# Patient Record
Sex: MALE | Race: WHITE | Employment: FULL TIME | URBAN - METROPOLITAN AREA
[De-identification: names, ages, dates, MRNs, and addresses within clinical notes are randomized per-mention and may not be internally consistent; named-entity substitution may affect disease eponyms.]

---

## 2018-10-20 ENCOUNTER — HOSPITAL ENCOUNTER (EMERGENCY)
Age: 58
Discharge: HOME | End: 2018-10-20
Attending: EMERGENCY MEDICINE
Payer: COMMERCIAL

## 2018-10-20 VITALS
WEIGHT: 265 LBS | TEMPERATURE: 97.8 F | OXYGEN SATURATION: 98 % | BODY MASS INDEX: 35.12 KG/M2 | HEIGHT: 73 IN | SYSTOLIC BLOOD PRESSURE: 149 MMHG | RESPIRATION RATE: 17 BRPM | HEART RATE: 82 BPM | DIASTOLIC BLOOD PRESSURE: 65 MMHG

## 2018-10-20 DIAGNOSIS — T15.92XA EYE FOREIGN BODIES, LEFT, INITIAL ENCOUNTER: Primary | ICD-10-CM

## 2018-10-20 PROCEDURE — 90715 TDAP VACCINE 7 YRS/> IM: CPT | Performed by: EMERGENCY MEDICINE

## 2018-10-20 PROCEDURE — 90471 IMMUNIZATION ADMIN: CPT | Performed by: EMERGENCY MEDICINE

## 2018-10-20 PROCEDURE — 6370000000 HC RX 637 (ALT 250 FOR IP): Performed by: EMERGENCY MEDICINE

## 2018-10-20 PROCEDURE — 99283 EMERGENCY DEPT VISIT LOW MDM: CPT

## 2018-10-20 PROCEDURE — 6370000000 HC RX 637 (ALT 250 FOR IP)

## 2018-10-20 PROCEDURE — 6360000002 HC RX W HCPCS: Performed by: EMERGENCY MEDICINE

## 2018-10-20 RX ORDER — ERYTHROMYCIN 5 MG/G
OINTMENT OPHTHALMIC ONCE
Status: COMPLETED | OUTPATIENT
Start: 2018-10-20 | End: 2018-10-20

## 2018-10-20 RX ADMIN — ERYTHROMYCIN: 5 OINTMENT OPHTHALMIC at 16:23

## 2018-10-20 RX ADMIN — TETANUS TOXOID, REDUCED DIPHTHERIA TOXOID AND ACELLULAR PERTUSSIS VACCINE, ADSORBED 0.5 ML: 5; 2.5; 8; 8; 2.5 SUSPENSION INTRAMUSCULAR at 16:23

## 2018-10-20 RX ADMIN — FLUORESCEIN SODIUM 1 MG: 1 STRIP OPHTHALMIC at 16:23

## 2018-10-20 ASSESSMENT — PAIN DESCRIPTION - LOCATION: LOCATION: EYE

## 2018-10-20 ASSESSMENT — ENCOUNTER SYMPTOMS: EYE PAIN: 1

## 2018-10-20 ASSESSMENT — PAIN DESCRIPTION - PAIN TYPE: TYPE: ACUTE PAIN

## 2018-10-20 ASSESSMENT — PAIN DESCRIPTION - ORIENTATION: ORIENTATION: LEFT

## 2018-10-20 ASSESSMENT — PAIN SCALES - GENERAL: PAINLEVEL_OUTOF10: 7

## 2018-10-20 NOTE — ED PROVIDER NOTES
History    Marital status: Single     Spouse name: N/A    Number of children: N/A    Years of education: N/A     Social History Main Topics    Smoking status: Former Smoker    Smokeless tobacco: None    Alcohol use 0.5 oz/week     1 drink(s) per week    Drug use: No    Sexual activity: No     Other Topics Concern    None     Social History Narrative    None       SCREENINGS    Key Coma Scale  Eye Opening: Spontaneous  Best Verbal Response: Oriented  Best Motor Response: Obeys commands  Key Coma Scale Score: 15        PHYSICAL EXAM    (up to 7 for level 4, 8 or more for level 5)     ED Triage Vitals [10/20/18 1607]   BP Temp Temp Source Pulse Resp SpO2 Height Weight   (!) 149/65 97.8 °F (36.6 °C) Oral 82 18 95 % 6' 1\" (1.854 m) 265 lb (120.2 kg)       Physical Exam   Constitutional: He appears well-developed and well-nourished. No distress. HENT:   Head: Normocephalic and atraumatic. Mouth/Throat: Oropharynx is clear and moist.   Eyes: Pupils are equal, round, and reactive to light. Conjunctivae and EOM are normal. Right eye exhibits no chemosis. Foreign body present in the right eye. Left eye exhibits no chemosis. Foreign body present in the left eye. Right conjunctiva is not injected. Left conjunctiva is not injected. Left conjunctiva has no hemorrhage. Pupils are equally round and reacting normally. Extraoccular muscles are grossly intact. Neck: Normal range of motion. No tracheal deviation present. Cardiovascular: Normal rate, regular rhythm, normal heart sounds and intact distal pulses. Exam reveals no friction rub. No murmur heard. Pulmonary/Chest: Effort normal and breath sounds normal. No stridor. No respiratory distress. He has no wheezes. He has no rales. Abdominal: Soft. He exhibits no distension. Musculoskeletal: Normal range of motion. He exhibits no edema or deformity. Neurological: He is alert. Answering questions appropriately. No gaze deficit.  No gait note that portions of this note were completed with a voice recognition program.  Efforts were made to edit the dictations but occasionally words are mis-transcribed.)    Jean Pierre Sanders DO (electronically signed)  Attending Emergency Physician       Jean Pierre Sanders DO  10/20/18 1852

## 2023-03-05 ENCOUNTER — HOSPITAL ENCOUNTER (EMERGENCY)
Age: 63
Discharge: HOME | End: 2023-03-05
Attending: EMERGENCY MEDICINE
Payer: COMMERCIAL

## 2023-03-05 VITALS
BODY MASS INDEX: 37.77 KG/M2 | HEART RATE: 70 BPM | WEIGHT: 285 LBS | OXYGEN SATURATION: 96 % | RESPIRATION RATE: 16 BRPM | HEIGHT: 73 IN | SYSTOLIC BLOOD PRESSURE: 147 MMHG | DIASTOLIC BLOOD PRESSURE: 86 MMHG | TEMPERATURE: 97.9 F

## 2023-03-05 DIAGNOSIS — K04.7 DENTAL ABSCESS: ICD-10-CM

## 2023-03-05 DIAGNOSIS — K02.9 DENTAL DECAY: ICD-10-CM

## 2023-03-05 DIAGNOSIS — K08.89 ODONTALGIA: Primary | ICD-10-CM

## 2023-03-05 PROCEDURE — 99283 EMERGENCY DEPT VISIT LOW MDM: CPT

## 2023-03-05 PROCEDURE — 6370000000 HC RX 637 (ALT 250 FOR IP): Performed by: EMERGENCY MEDICINE

## 2023-03-05 RX ORDER — CHLORHEXIDINE GLUCONATE 0.12 MG/ML
15 RINSE ORAL 2 TIMES DAILY
Qty: 420 ML | Refills: 0 | Status: SHIPPED | OUTPATIENT
Start: 2023-03-05 | End: 2023-03-19

## 2023-03-05 RX ORDER — KETOROLAC TROMETHAMINE 10 MG/1
10 TABLET, FILM COATED ORAL EVERY 6 HOURS PRN
Qty: 20 TABLET | Refills: 0 | Status: SHIPPED | OUTPATIENT
Start: 2023-03-05

## 2023-03-05 RX ORDER — CLINDAMYCIN HYDROCHLORIDE 150 MG/1
450 CAPSULE ORAL 3 TIMES DAILY
Qty: 90 CAPSULE | Refills: 0 | Status: SHIPPED | OUTPATIENT
Start: 2023-03-05 | End: 2023-03-15

## 2023-03-05 RX ORDER — CLINDAMYCIN HYDROCHLORIDE 150 MG/1
450 CAPSULE ORAL ONCE
Status: COMPLETED | OUTPATIENT
Start: 2023-03-05 | End: 2023-03-05

## 2023-03-05 RX ORDER — OXYCODONE HYDROCHLORIDE AND ACETAMINOPHEN 5; 325 MG/1; MG/1
1 TABLET ORAL ONCE
Status: COMPLETED | OUTPATIENT
Start: 2023-03-05 | End: 2023-03-05

## 2023-03-05 RX ORDER — OXYCODONE HYDROCHLORIDE AND ACETAMINOPHEN 5; 325 MG/1; MG/1
1-2 TABLET ORAL EVERY 6 HOURS PRN
Qty: 10 TABLET | Refills: 0 | Status: SHIPPED | OUTPATIENT
Start: 2023-03-05 | End: 2023-03-08

## 2023-03-05 RX ADMIN — OXYCODONE AND ACETAMINOPHEN 1 TABLET: 5; 325 TABLET ORAL at 01:58

## 2023-03-05 RX ADMIN — CLINDAMYCIN HYDROCHLORIDE 450 MG: 150 CAPSULE ORAL at 01:58

## 2023-03-05 ASSESSMENT — PAIN SCALES - GENERAL: PAINLEVEL_OUTOF10: 8

## 2023-03-05 ASSESSMENT — ENCOUNTER SYMPTOMS
PHOTOPHOBIA: 0
COLOR CHANGE: 0
ABDOMINAL PAIN: 0
ABDOMINAL DISTENTION: 0
COUGH: 0
VOMITING: 0
NAUSEA: 0
CONSTIPATION: 0
RHINORRHEA: 0
APNEA: 0
WHEEZING: 0
SHORTNESS OF BREATH: 0
EYE PAIN: 0
BACK PAIN: 0
SORE THROAT: 0
DIARRHEA: 0
SINUS PRESSURE: 0

## 2023-03-05 ASSESSMENT — PAIN DESCRIPTION - PAIN TYPE: TYPE: ACUTE PAIN

## 2023-03-05 ASSESSMENT — PAIN DESCRIPTION - LOCATION: LOCATION: TEETH

## 2023-03-05 ASSESSMENT — PAIN DESCRIPTION - DESCRIPTORS: DESCRIPTORS: THROBBING

## 2023-03-05 ASSESSMENT — PAIN DESCRIPTION - FREQUENCY: FREQUENCY: CONTINUOUS

## 2023-03-05 ASSESSMENT — PAIN DESCRIPTION - ONSET: ONSET: PROGRESSIVE

## 2023-03-05 NOTE — ED PROVIDER NOTES
2000 \[facility]\"" ED  eMERGENCY dEPARTMENT eNCOUnter      Pt Name: Gunner Morel  MRN: [id number]  [place] 1960  Date of evaluation: 3/5/2023  Provider: Tammy Bethea MD    CHIEF COMPLAINT       Chief Complaint   Patient presents with    Dental Pain     Right upper jaw         HISTORY OF PRESENT ILLNESS   (Location/Symptom, Timing/Onset,Context/Setting, Quality, Duration, Modifying Factors, Severity)  Note limiting factors. Gunner Morel is a 63 y.o. male who presents to the emergency department with complaint of right upper jaw swelling and dental pain since yesterday. Patient had a crown placed that broke off. He has an appointment with dentist on the 20th. Pain is 8 on a scale of 1-10. Orajel Tylenol are not helping. Hot and cold drinks make it worse. HPI    Nursing Notes were reviewed. REVIEW OF SYSTEMS    (2-9 systems for level 4, 10 or more for level 5)     Review of Systems   Constitutional: Negative. Negative for activity change, appetite change, chills, fatigue and fever. HENT:  Positive for dental problem. Negative for congestion, ear discharge, ear pain, hearing loss, rhinorrhea, sinus pressure and sore throat. Eyes:  Negative for photophobia, pain and visual disturbance. Respiratory:  Negative for apnea, cough, shortness of breath and wheezing. Cardiovascular:  Negative for chest pain, palpitations and leg swelling. Gastrointestinal:  Negative for abdominal distention, abdominal pain, constipation, diarrhea, nausea and vomiting. Endocrine: Negative for cold intolerance, heat intolerance and polyuria. Genitourinary:  Negative for dysuria, flank pain, frequency and urgency. Musculoskeletal:  Negative for arthralgias, back pain, gait problem, myalgias and neck stiffness. Skin:  Negative for color change, pallor and rash. Allergic/Immunologic: Negative for food allergies and immunocompromised state.    Neurological:  Negative for dizziness, tremors, syncope, weakness, light-headedness and headaches. Psychiatric/Behavioral:  Negative for agitation, confusion and hallucinations. All other systems reviewed and are negative. Except as noted above the remainder of the review of systems was reviewed and negative. PAST MEDICAL HISTORY     Past Medical History:   Diagnosis Date    Chronic back pain     Chronic kidney disease     Erectile dysfunction     Osteoarthritis          SURGICAL HISTORY       Past Surgical History:   Procedure Laterality Date    BACK SURGERY      JOINT REPLACEMENT      hip replacement          CURRENT MEDICATIONS       Previous Medications    AVODART 0.5 MG CAPSULE        OXYBUTYNIN (DITROPAN XL) 10 MG CR TABLET    Take 1 tablet by mouth daily. OXYBUTYNIN (DITROPAN-XL) 5 MG CR TABLET        ROPINIROLE (REQUIP) 0.5 MG TABLET        TAMSULOSIN (FLOMAX) 0.4 MG CAPSULE        TRAMADOL (ULTRAM) 50 MG TABLET           ALLERGIES     Pcn [penicillins]    FAMILY HISTORY     No family history on file. SOCIAL HISTORY       Social History     Socioeconomic History    Marital status: Single   Tobacco Use    Smoking status: Former   Substance and Sexual Activity    Alcohol use: Yes     Alcohol/week: 0.8 standard drinks     Types: 1 drink(s) per week    Drug use: No    Sexual activity: Never       SCREENINGS             PHYSICAL EXAM    (up to 7 for level 4, 8 or more for level 5)     ED Triage Vitals [03/05/23 0135]   BP Temp Temp Source Heart Rate Resp SpO2 Height Weight   (!) 147/86 97.9 °F (36.6 °C) Oral 70 16 96 % 6' 1\" (1.854 m) 285 lb (129.3 kg)       Physical Exam  Vitals and nursing note reviewed. Constitutional:       General: He is not in acute distress. Appearance: Normal appearance. He is well-developed and normal weight. He is not ill-appearing, toxic-appearing or diaphoretic. HENT:      Head: Normocephalic and atraumatic. Nose: Nose normal. No congestion or rhinorrhea.       Mouth/Throat:      Mouth: Mucous membranes are moist.      Pharynx: Oropharynx is clear. No oropharyngeal exudate or posterior oropharyngeal erythema. Comments: Diffuse dental decay and cavities. Right facial swelling. Eyes:      General: No scleral icterus. Right eye: No discharge. Left eye: No discharge. Conjunctiva/sclera: Conjunctivae normal.      Pupils: Pupils are equal, round, and reactive to light. Neck:      Thyroid: No thyromegaly. Vascular: No carotid bruit or JVD. Trachea: No tracheal deviation. Cardiovascular:      Rate and Rhythm: Normal rate and regular rhythm. Pulses: Normal pulses. Heart sounds: Normal heart sounds. No murmur heard. No friction rub. No gallop. Pulmonary:      Effort: Pulmonary effort is normal. No respiratory distress. Breath sounds: Normal breath sounds. No stridor. No wheezing, rhonchi or rales. Chest:      Chest wall: No tenderness. Abdominal:      General: Abdomen is flat. Bowel sounds are normal. There is no distension. Palpations: Abdomen is soft. There is no mass. Tenderness: There is no abdominal tenderness. There is no right CVA tenderness, left CVA tenderness, guarding or rebound. Hernia: No hernia is present. Musculoskeletal:         General: No swelling, tenderness, deformity or signs of injury. Normal range of motion. Cervical back: Normal range of motion and neck supple. No rigidity or tenderness. Right lower leg: No edema. Left lower leg: No edema. Lymphadenopathy:      Cervical: No cervical adenopathy. Skin:     General: Skin is warm and dry. Capillary Refill: Capillary refill takes less than 2 seconds. Coloration: Skin is not jaundiced or pale. Findings: No bruising, erythema, lesion or rash. Neurological:      General: No focal deficit present. Mental Status: He is alert and oriented to person, place, and time. Mental status is at baseline. Cranial Nerves: No cranial nerve deficit. Sensory: No sensory deficit. Motor: No weakness or abnormal muscle tone. Coordination: Coordination normal.      Gait: Gait normal.      Deep Tendon Reflexes: Reflexes are normal and symmetric. Reflexes normal.   Psychiatric:         Mood and Affect: Mood normal.         Behavior: Behavior normal.         Thought Content: Thought content normal.         Judgment: Judgment normal.       DIAGNOSTIC RESULTS     EKG: All EKG's are interpreted by the Emergency Department Physician who either signs or Co-signs this chart in the absence of a cardiologist.        RADIOLOGY:   Non-plain film images such as CT, Ultrasound and MRI are read by the radiologist. Idania Arch radiographicimages are visualized and preliminarily interpreted by the emergency physician with the below findings:        Interpretation per the Radiologist below, if available at the time of this note:    No orders to display         ED BEDSIDE ULTRASOUND:   Performed by ED Physician - none    LABS:  Labs Reviewed - No data to display    All other labs were within normal range or not returned as of this dictation. EMERGENCY DEPARTMENT COURSE and DIFFERENTIALDIAGNOSIS/MDM:   Vitals:    Vitals:    03/05/23 0135   BP: (!) 147/86   Pulse: 70   Resp: 16   Temp: 97.9 °F (36.6 °C)   TempSrc: Oral   SpO2: 96%   Weight: 285 lb (129.3 kg)   Height: 6' 1\" (1.854 m)           MDM     Amount and/or Complexity of Data Reviewed  Review and summarize past medical records: yes    Risk of Complications, Morbidity, and/or Mortality  Presenting problems: moderate  Diagnostic procedures: minimal  Management options: moderate    Patient Progress  Patient progress: improved      CRITICAL CARE TIME   Total Critical Care time was  minutes, excluding separately reportable procedures. There was a high probability of clinically significant/life threatening deterioration in the patient's condition which required my urgentintervention. CONSULTS:  None    PROCEDURES:  Unless otherwise noted below, none     Procedures    FINAL IMPRESSION      1. Odontalgia    2. Dental decay    3. Dental abscess          DISPOSITION/PLAN   DISPOSITION Decision To Discharge 03/05/2023 01:50:35 AM      PATIENT REFERRED TO:  Elly Jeter DO  [address] [facility] [address] [phone]    In 3 days      Follow-up with your dentist on Monday, 6 March 2023          DISCHARGE MEDICATIONS:  New Prescriptions    CHLORHEXIDINE (PERIDEX) 0.12 % SOLUTION    Swish and spit 15 mLs 2 times daily for 14 days    CLINDAMYCIN (CLEOCIN) 150 MG CAPSULE    Take 3 capsules by mouth 3 times daily for 10 days    KETOROLAC (TORADOL) 10 MG TABLET    Take 1 tablet by mouth every 6 hours as needed for Pain    OXYCODONE-ACETAMINOPHEN (PERCOCET) 5-325 MG PER TABLET    Take 1-2 tablets by mouth every 6 hours as needed for Pain for up to 3 days. WARNING:  May cause drowsiness. May impair ability to operate vehicles or machinery. Do not use in combination with alcohol.  Max Daily Amount: 8 tablets          (Please note that portions of this note were completed with a voice recognitionprogram.  Efforts were made to edit the dictations but occasionally words are mis-transcribed.)    Phu Block MD (electronically signed)  Attending Emergency Physician          Phu Block MD  03/05/23 2401

## 2023-03-14 DIAGNOSIS — M54.50 CHRONIC BILATERAL LOW BACK PAIN, UNSPECIFIED WHETHER SCIATICA PRESENT: Primary | ICD-10-CM

## 2023-03-14 DIAGNOSIS — G89.29 CHRONIC BILATERAL LOW BACK PAIN, UNSPECIFIED WHETHER SCIATICA PRESENT: Primary | ICD-10-CM

## 2023-03-14 RX ORDER — TRAMADOL HYDROCHLORIDE 50 MG/1
50 TABLET ORAL 2 TIMES DAILY PRN
Qty: 15 TABLET | Refills: 0 | Status: CANCELLED | OUTPATIENT
Start: 2023-03-14

## 2023-03-14 RX ORDER — TRAMADOL HYDROCHLORIDE 50 MG/1
1-2 TABLET ORAL EVERY 12 HOURS PRN
COMMUNITY
End: 2023-03-18 | Stop reason: SDUPTHER

## 2023-03-14 NOTE — TELEPHONE ENCOUNTER
OARRS reviewed.    Pt has RX for Percocet 3/5/2023.  Need to verify why he had RX and from where (I believe it was an ER visit).

## 2023-03-16 NOTE — TELEPHONE ENCOUNTER
This was from [facility] ER visit for tooth infection. He has stopped taking the percocet.

## 2023-03-18 PROBLEM — G47.61 PERIODIC LIMB MOVEMENT: Status: ACTIVE | Noted: 2023-03-18

## 2023-03-18 PROBLEM — G47.33 OSA (OBSTRUCTIVE SLEEP APNEA): Status: ACTIVE | Noted: 2023-03-18

## 2023-03-18 PROBLEM — F17.200 CURRENT SMOKER: Status: ACTIVE | Noted: 2023-03-18

## 2023-03-18 PROBLEM — M79.675 CHRONIC PAIN OF TOE OF LEFT FOOT: Status: ACTIVE | Noted: 2023-03-18

## 2023-03-18 PROBLEM — M25.511 CHRONIC RIGHT SHOULDER PAIN: Status: ACTIVE | Noted: 2023-03-18

## 2023-03-18 PROBLEM — G89.29 CHRONIC PAIN OF TOE OF LEFT FOOT: Status: ACTIVE | Noted: 2023-03-18

## 2023-03-18 PROBLEM — M54.9 CHRONIC BACK PAIN: Status: ACTIVE | Noted: 2023-03-18

## 2023-03-18 PROBLEM — L60.0 ONYCHOCRYPTOSIS: Status: ACTIVE | Noted: 2023-03-18

## 2023-03-18 PROBLEM — B35.1 NAIL FUNGUS: Status: ACTIVE | Noted: 2023-03-18

## 2023-03-18 PROBLEM — G89.29 CHRONIC RIGHT SHOULDER PAIN: Status: ACTIVE | Noted: 2023-03-18

## 2023-03-18 PROBLEM — R33.9 URINARY RETENTION: Status: ACTIVE | Noted: 2023-03-18

## 2023-03-18 PROBLEM — E55.9 VITAMIN D DEFICIENCY: Status: ACTIVE | Noted: 2023-03-18

## 2023-03-18 PROBLEM — G89.29 CHRONIC PAIN OF TOE OF RIGHT FOOT: Status: ACTIVE | Noted: 2023-03-18

## 2023-03-18 PROBLEM — G89.29 CHRONIC BACK PAIN: Status: ACTIVE | Noted: 2023-03-18

## 2023-03-18 PROBLEM — R25.2 NOCTURNAL MUSCLE CRAMP: Status: ACTIVE | Noted: 2023-03-18

## 2023-03-18 PROBLEM — K76.0 NAFLD (NONALCOHOLIC FATTY LIVER DISEASE): Status: ACTIVE | Noted: 2023-03-18

## 2023-03-18 PROBLEM — N40.0 BENIGN PROSTATE HYPERPLASIA: Status: ACTIVE | Noted: 2023-03-18

## 2023-03-18 PROBLEM — D69.6 THROMBOCYTOPENIA (CMS-HCC): Status: ACTIVE | Noted: 2023-03-18

## 2023-03-18 PROBLEM — N52.9 ERECTILE DYSFUNCTION: Status: ACTIVE | Noted: 2023-03-18

## 2023-03-18 PROBLEM — E78.5 HYPERLIPEMIA: Status: ACTIVE | Noted: 2023-03-18

## 2023-03-18 PROBLEM — M79.674 CHRONIC PAIN OF TOE OF RIGHT FOOT: Status: ACTIVE | Noted: 2023-03-18

## 2023-03-18 PROBLEM — R79.89 ELEVATED LFTS: Status: ACTIVE | Noted: 2023-03-18

## 2023-03-18 PROBLEM — K21.9 GERD (GASTROESOPHAGEAL REFLUX DISEASE): Status: ACTIVE | Noted: 2023-03-18

## 2023-03-18 PROBLEM — R73.9 ELEVATED BLOOD SUGAR: Status: ACTIVE | Noted: 2023-03-18

## 2023-03-18 RX ORDER — TRAMADOL HYDROCHLORIDE 50 MG/1
50-100 TABLET ORAL EVERY 12 HOURS PRN
Qty: 36 TABLET | Refills: 0 | Status: SHIPPED | OUTPATIENT
Start: 2023-03-18 | End: 2023-04-05 | Stop reason: SDUPTHER

## 2023-04-04 RX ORDER — KETOROLAC TROMETHAMINE 10 MG/1
1 TABLET, FILM COATED ORAL EVERY 6 HOURS PRN
COMMUNITY
Start: 2023-03-05 | End: 2023-04-05 | Stop reason: ALTCHOICE

## 2023-04-04 RX ORDER — LANOLIN ALCOHOL/MO/W.PET/CERES
1 CREAM (GRAM) TOPICAL DAILY
COMMUNITY
Start: 2022-10-03

## 2023-04-04 RX ORDER — MELOXICAM 15 MG/1
15 TABLET ORAL DAILY
COMMUNITY
Start: 2023-01-10 | End: 2023-04-05 | Stop reason: ALTCHOICE

## 2023-04-04 RX ORDER — CLINDAMYCIN HYDROCHLORIDE 300 MG/1
CAPSULE ORAL
COMMUNITY
Start: 2023-02-25 | End: 2023-04-05 | Stop reason: ALTCHOICE

## 2023-04-04 RX ORDER — CLINDAMYCIN HYDROCHLORIDE 150 MG/1
CAPSULE ORAL
COMMUNITY
Start: 2023-03-05 | End: 2023-04-05 | Stop reason: ALTCHOICE

## 2023-04-04 RX ORDER — OXYBUTYNIN CHLORIDE 5 MG/1
5 TABLET ORAL 2 TIMES DAILY
COMMUNITY
End: 2023-07-05 | Stop reason: SDUPTHER

## 2023-04-04 RX ORDER — ROPINIROLE 0.5 MG/1
0.5 TABLET, FILM COATED ORAL NIGHTLY
COMMUNITY
End: 2023-04-05 | Stop reason: SDUPTHER

## 2023-04-04 RX ORDER — TAMSULOSIN HYDROCHLORIDE 0.4 MG/1
0.4 CAPSULE ORAL DAILY
COMMUNITY
End: 2023-04-05 | Stop reason: SDUPTHER

## 2023-04-04 RX ORDER — VIT C/E/ZN/COPPR/LUTEIN/ZEAXAN 250MG-90MG
CAPSULE ORAL
COMMUNITY
Start: 2018-08-01

## 2023-04-04 RX ORDER — ESOMEPRAZOLE MAGNESIUM 40 MG/1
40 CAPSULE, DELAYED RELEASE ORAL DAILY
COMMUNITY
End: 2023-04-05 | Stop reason: SDUPTHER

## 2023-04-04 RX ORDER — DUTASTERIDE 0.5 MG/1
0.5 CAPSULE, LIQUID FILLED ORAL DAILY
COMMUNITY
End: 2023-04-05 | Stop reason: SDUPTHER

## 2023-04-04 RX ORDER — OXYCODONE AND ACETAMINOPHEN 5; 325 MG/1; MG/1
TABLET ORAL
COMMUNITY
Start: 2023-03-05 | End: 2023-04-05 | Stop reason: ALTCHOICE

## 2023-04-04 RX ORDER — CHLORHEXIDINE GLUCONATE ORAL RINSE 1.2 MG/ML
SOLUTION DENTAL
COMMUNITY
Start: 2023-03-05 | End: 2023-04-05 | Stop reason: ALTCHOICE

## 2023-04-05 ENCOUNTER — OFFICE VISIT (OUTPATIENT)
Dept: PRIMARY CARE | Facility: CLINIC | Age: 63
End: 2023-04-05
Payer: COMMERCIAL

## 2023-04-05 VITALS
DIASTOLIC BLOOD PRESSURE: 72 MMHG | WEIGHT: 288 LBS | RESPIRATION RATE: 17 BRPM | SYSTOLIC BLOOD PRESSURE: 134 MMHG | HEART RATE: 77 BPM | BODY MASS INDEX: 38.17 KG/M2 | HEIGHT: 73 IN | OXYGEN SATURATION: 93 % | TEMPERATURE: 98.6 F

## 2023-04-05 DIAGNOSIS — K21.9 GASTROESOPHAGEAL REFLUX DISEASE WITHOUT ESOPHAGITIS: ICD-10-CM

## 2023-04-05 DIAGNOSIS — Z12.5 PROSTATE CANCER SCREENING: ICD-10-CM

## 2023-04-05 DIAGNOSIS — D69.6 THROMBOCYTOPENIA (CMS-HCC): ICD-10-CM

## 2023-04-05 DIAGNOSIS — F17.200 CURRENT SMOKER: ICD-10-CM

## 2023-04-05 DIAGNOSIS — E78.2 MIXED HYPERLIPIDEMIA: Primary | ICD-10-CM

## 2023-04-05 DIAGNOSIS — G89.29 CHRONIC BILATERAL LOW BACK PAIN, UNSPECIFIED WHETHER SCIATICA PRESENT: ICD-10-CM

## 2023-04-05 DIAGNOSIS — Z79.899 HIGH RISK MEDICATION USE: ICD-10-CM

## 2023-04-05 DIAGNOSIS — R73.01 IMPAIRED FASTING GLUCOSE: ICD-10-CM

## 2023-04-05 DIAGNOSIS — G47.33 OSA (OBSTRUCTIVE SLEEP APNEA): ICD-10-CM

## 2023-04-05 DIAGNOSIS — G47.61 PERIODIC LIMB MOVEMENT: ICD-10-CM

## 2023-04-05 DIAGNOSIS — N40.1 BENIGN PROSTATIC HYPERPLASIA WITH URINARY RETENTION: ICD-10-CM

## 2023-04-05 DIAGNOSIS — E55.9 VITAMIN D DEFICIENCY: ICD-10-CM

## 2023-04-05 DIAGNOSIS — R33.9 URINARY RETENTION: ICD-10-CM

## 2023-04-05 DIAGNOSIS — R33.8 BENIGN PROSTATIC HYPERPLASIA WITH URINARY RETENTION: ICD-10-CM

## 2023-04-05 DIAGNOSIS — K76.0 NAFLD (NONALCOHOLIC FATTY LIVER DISEASE): ICD-10-CM

## 2023-04-05 DIAGNOSIS — M54.50 CHRONIC BILATERAL LOW BACK PAIN, UNSPECIFIED WHETHER SCIATICA PRESENT: ICD-10-CM

## 2023-04-05 PROCEDURE — 99214 OFFICE O/P EST MOD 30 MIN: CPT | Performed by: FAMILY MEDICINE

## 2023-04-05 RX ORDER — CALCIUM CARBONATE 200(500)MG
1 TABLET,CHEWABLE ORAL
COMMUNITY
End: 2024-04-03

## 2023-04-05 RX ORDER — TAMSULOSIN HYDROCHLORIDE 0.4 MG/1
0.4 CAPSULE ORAL
COMMUNITY
End: 2023-04-05 | Stop reason: SDUPTHER

## 2023-04-05 RX ORDER — TRAMADOL HYDROCHLORIDE 50 MG/1
50-100 TABLET ORAL EVERY 12 HOURS PRN
Qty: 60 TABLET | Refills: 0 | Status: SHIPPED | OUTPATIENT
Start: 2023-04-05 | End: 2023-05-10 | Stop reason: SDUPTHER

## 2023-04-05 RX ORDER — DUTASTERIDE 0.5 MG/1
0.5 CAPSULE, LIQUID FILLED ORAL DAILY
Qty: 90 CAPSULE | Refills: 0 | Status: SHIPPED | OUTPATIENT
Start: 2023-04-05 | End: 2023-07-05 | Stop reason: SDUPTHER

## 2023-04-05 RX ORDER — TAMSULOSIN HYDROCHLORIDE 0.4 MG/1
0.4 CAPSULE ORAL
Qty: 90 CAPSULE | Refills: 0 | Status: SHIPPED | OUTPATIENT
Start: 2023-04-05 | End: 2023-07-05 | Stop reason: SDUPTHER

## 2023-04-05 RX ORDER — ROPINIROLE 0.5 MG/1
0.5 TABLET, FILM COATED ORAL NIGHTLY
Qty: 90 TABLET | Refills: 0 | Status: SHIPPED | OUTPATIENT
Start: 2023-04-05 | End: 2023-07-05 | Stop reason: SDUPTHER

## 2023-04-05 RX ORDER — ESOMEPRAZOLE MAGNESIUM 40 MG/1
40 CAPSULE, DELAYED RELEASE ORAL DAILY
Qty: 90 CAPSULE | Refills: 0 | Status: SHIPPED | OUTPATIENT
Start: 2023-04-05 | End: 2023-05-08 | Stop reason: SDUPTHER

## 2023-04-05 RX ORDER — IBUPROFEN 100 MG/5ML
1000 SUSPENSION, ORAL (FINAL DOSE FORM) ORAL
COMMUNITY

## 2023-04-05 RX ORDER — DUTASTERIDE 0.5 MG/1
0.5 CAPSULE, LIQUID FILLED ORAL DAILY
COMMUNITY
End: 2023-04-05 | Stop reason: SDUPTHER

## 2023-04-05 ASSESSMENT — PATIENT HEALTH QUESTIONNAIRE - PHQ9
2. FEELING DOWN, DEPRESSED OR HOPELESS: NOT AT ALL
1. LITTLE INTEREST OR PLEASURE IN DOING THINGS: NOT AT ALL
SUM OF ALL RESPONSES TO PHQ9 QUESTIONS 1 AND 2: 0

## 2023-04-05 ASSESSMENT — LIFESTYLE VARIABLES: TOTAL SCORE: 0

## 2023-04-05 NOTE — PROGRESS NOTES
Subjective   Patient ID: Ivan Johnson is a 62 y.o. male who presents for 3 month follow up monitoring and management of sleep apnea, BPH with urinary retention, chronic back pain, tobacco abuse, GERD, hyperlipidemia, NAFLD, periodic limb movement disorder, and vitamin D deficiency.    HPI     Pt would is thinking about filling for disability, he states he is unable to do his job effectively due to the pain he has on a daily basis.   He goes home in pain on a daily basis.      Patient has hyperlipidemia.  Patient tries to limit the amount of fatty foods and high cholesterol foods that he consumes  His hyperlipidemia is currently treated with dietary/ lifestyle changes.      He has GERD.  He was treated with Esomeprazole.   He denies any breakthrough reflux symptoms.     Patient has obstructive sleep apnea.  Patient utilizes a CPAP machine on a nightly basis.  He is tolerating the machine well and does feel well rested in the mornings.    He has periodic leg movement disorder.  Symptoms were stable when he was on Requip.    He has chronic lower back pain and hip pain.  This has been effectively treated with Tramadol as needed.  NSAIDs have not provided adequate relief in the past.    He has a history of BPH/urinary frequency/urinary retention.  Symptoms have been well-controlled on the current combination of oxybutynin and Flomax.     He has nonalcoholic fatty liver disease.  Pt underwent laboratory evaluation as well as an ultrasound of the liver.   Patient has not had an actual biopsy performed.     He has vitamin D deficiency.  Patient continues to take over-the-counter vitamin D supplementation of 1000 units daily.       OARRS:  Sushant Wiseman,  on 4/5/2023  6:10 PM  I have personally reviewed the OARRS report for Ivan Johnson. I have considered the risks of abuse, dependence, addiction and diversion    Is the patient prescribed a combination of a benzodiazepine and opioid?  No    Last Urine Drug Screen /  ordered today: Yes  Recent Results (from the past 13063 hour(s))   Drug Screen, Urine With Reflex to Confirmation    Collection Time: 03/24/22  1:00 PM   Result Value Ref Range    DRUG SCREEN COMMENT URINE SEE BELOW     Amphetamine Screen, Urine PRESUMPTIVE NEGATIVE NEGATIVE    Barbiturate Screen, Urine PRESUMPTIVE NEGATIVE NEGATIVE    BENZODIAZEPINE (PRESENCE) IN URINE BY SCREEN METHOD PRESUMPTIVE NEGATIVE NEGATIVE    Cannabinoid Screen, Urine PRESUMPTIVE NEGATIVE NEGATIVE    Cocaine Screen, Urine PRESUMPTIVE NEGATIVE NEGATIVE    Fentanyl, Ur PRESUMPTIVE NEGATIVE NEGATIVE    Methadone Screen, Urine PRESUMPTIVE NEGATIVE NEGATIVE    Opiate Screen, Urine PRESUMPTIVE NEGATIVE NEGATIVE    Oxycodone Screen, Ur PRESUMPTIVE NEGATIVE NEGATIVE    PCP Screen, Urine PRESUMPTIVE NEGATIVE NEGATIVE     Results are as expected.     Controlled Substance Agreement:  Date of the Last Agreement: 4/5/2023  Reviewed Controlled Substance Agreement including but not limited to the benefits, risks, and alternatives to treatment with a Controlled Substance medication(s).    Opioids:  What is the patient's goal of therapy? Reduce chronic pain as needed  Is this being achieved with current treatment? yes    I have calculated the patient's Morphine Dose Equivalent (MED):   I have considered referral to Pain Management and/or a specialist, and do not feel it is necessary at this time.    I feel that it is clinically indicated to continue this current medication regimen after consideration of alternative therapies, and other non-opioid treatment.    Opioid Risk Screening:  Family History of Substance Abuse  Alcohol: 0 (4/5/2023  6:00 PM)  Illegal Drugs: 0 (4/5/2023  6:00 PM)  Prescription Drugs: 0 (4/5/2023  6:00 PM)    Personal History of Substance Abuse  Alcohol: 0 (4/5/2023  6:00 PM)  Illegal Drugs: 0 (4/5/2023  6:00 PM)  Prescription Drugs: 0 (4/5/2023  6:00 PM)    Patient Age (16-45)  Age (16-45): 0 (4/5/2023  6:00 PM)    History of  Preadolescent Sexual Abuse  History of Preadolescent Sexual Abuse: .0 (4/5/2023  6:00 PM)    Psychological Disease  Attention Deficit Disorder, Obsessive Compulsive Disorder, Bipolar, Schizophrenia: 0 (4/5/2023  6:00 PM)  Depression: 0 (4/5/2023  6:00 PM)    Total Score  Total Score: 0 (4/5/2023  6:00 PM)    Total Score Risk Category  TOTAL SCORE CATEGORY: Low Risk (0-3) (4/5/2023  6:00 PM)        Pain Assessment:  Analgesia  What was your pain level on average during the past week?: 3  What was your pain level at its worst during the past week?: 8  What percentage of your pain has been relieved during the past week?: 45 %  Is the amount of pain relief you are now obtaining from your current pain relievers enough to make a real difference in your life?: Y  Query to Clinician: Is the patient's pain relief clinically significant?: Yes    Activities of Daily Living  Physical Functioning: Same  Family Relationships: Same  Social Relationships: Same  Mood: Same  Sleep Patterns: Same  Overall Functioning: Same    Adverse Events  Is patient experiencing any side effects from current pain relievers?: N  Patient's Overall Severity of Side Effects: None      Assessment  Is your overall impression that this patient is benefiting from opioid therapy?: Yes  Specific Analgesic Plan: Continue present regimen                Review of Systems: Constitutional: Patient denies any fever, chills, loss of appetite, or unexplained weight loss.  Cardiovascular: Patient denies any chest pain, shortness of breath with exertion, tachycardia, palpitations, orthopnea, or paroxysmal nocturnal dyspnea.  Respiratory: Patient denies any cough, shortness breath, or wheezing.  Gastrointestinal patient denies any nausea, vomiting, diarrhea, constipation, melena, hematochezia, or reflux symptoms.  Skin: Denies any rashes or skin lesions   Neurology: Patient denies any new motor or sensory losses. Denies any numbness, tingling, weakness, and  "incoordination of the extremities. Patient also denies any tremor, seizures, or gait instability.  Endocrinology: Denies any polyuria, polydipsia, polyphagia, or heat/cold intolerance     Musculoskeletal: Patient denies any myalgia, arthralgia, joint swelling, or joint deformity   He does complain of chronic back and hip pain which is currently at baseline and stable on Tramadol.   Pt c/o intermittent cramping in legs, hands, and back that is usually concentrated in the late evening as noted in HPI.     Objective   /72   Pulse 77   Temp 37 °C (98.6 °F)   Resp 17   Ht 1.854 m (6' 1\")   Wt 131 kg (288 lb)   SpO2 93%   BMI 38.00 kg/m²     Physical Exam: Gen. appearance: Alert and cooperative, no acute distress, well-developed/well-nourished obese male.      Neck: Supple and without adenopathy, no JVD at 90° and no carotid bruits are noted. There is no thyromegaly, thyroid tenderness, or palpable thyroid nodules.  Cardiovascular: Regular rate and rhythm without murmur or ectopy. Pedal pulses normal.   Respiratory: Clear to auscultation bilaterally with good air exchange.  Skin: Good turgor, moist, warm and without rashes or lesions.  Neurological exam: Alert and oriented Ã--3, no tremor, normal gait.     Musculoskeletal: No noted joint effusions or gross bony deformities.  There is some bilateral lumbar paraspinal muscle tenderness without palpable spasm.    Assessment/Plan       Chronic back pain: Patient will continue with tramadol for pain as needed.  NSAIDs have been ineffective for his pain in the past.     Hyperlipidemia: Dietary changes, exercise, and weight loss were encouraged.  We will continue to monitor.    Vitamin D deficiency: Stable based on last vitamin D labs.  Patient to continue vitamin D supplementation of 2000 units daily.     NAFLD: He was encouraged to follow a low-fat diet and maintain a healthy weight.    Periodic leg movement disorder: Patient to continue Requip at current " dose.  The medication previously provided adequate relief of his symptoms.    BPH / urinary retention: Patient to continue tamsulosin and oxybutynin at current dosage.    Sleep apnea: Stable.   We will continue with his CPAP therapy.   He continues to benefit from the CPAP therapy.    Current smoker:   Pt has been encouraged to work on smoking cessation and available smoking cessation aids were discussed.  The importance of smoking cessation was discussed.   Patient understands that continued smoking will increase the risks of lung disease, vascular disease, and multiple malignancies.     GERD: Stable with the current medication and appropriate dietary changes.  Patient to continue Esomeprazole once daily.    Impaired fasting glucose: Stable based on symptoms.  He is not experiencing any polyuria, polydipsia, polyphagia.  Dietary changes, exercise, and maintenance of a healthy weight were discussed at length.    Thrombocytopenia:  Stable based on last labs.  We will continue to monitor.        Scribe Attestation  By signing my name below, ISanchez Scribe   attest that this documentation has been prepared under the direction and in the presence of Dr. Wiseman.

## 2023-04-05 NOTE — PATIENT INSTRUCTIONS
Follow up in 3 months with labs PRIOR    It was a pleasure to see you today. Thank you for choosing us for your health care needs.    If you have lab or other testing completed and have not been informed of results within one week, please call the office for your results.    If you haven't done so, consider signing up for [facility] [employer], the [facility] personal health record. Ask the staff how you can get started.

## 2023-04-06 PROCEDURE — 80361 OPIATES 1 OR MORE: CPT

## 2023-04-06 PROCEDURE — 80365 DRUG SCREENING OXYCODONE: CPT

## 2023-04-06 PROCEDURE — 80346 BENZODIAZEPINES1-12: CPT

## 2023-04-06 PROCEDURE — 80354 DRUG SCREENING FENTANYL: CPT

## 2023-04-06 PROCEDURE — 80373 DRUG SCREENING TRAMADOL: CPT

## 2023-04-06 PROCEDURE — 80368 SEDATIVE HYPNOTICS: CPT

## 2023-04-06 PROCEDURE — 80358 DRUG SCREENING METHADONE: CPT

## 2023-04-06 PROCEDURE — 80307 DRUG TEST PRSMV CHEM ANLYZR: CPT

## 2023-04-11 LAB
6-ACETYLMORPHINE: <25 NG/ML
7-AMINOCLONAZEPAM: <25 NG/ML
ALPHA-HYDROXYALPRAZOLAM: <25 NG/ML
ALPHA-HYDROXYMIDAZOLAM: <25 NG/ML
ALPRAZOLAM: <25 NG/ML
AMPHETAMINE (PRESENCE) IN URINE BY SCREEN METHOD: ABNORMAL
BARBITURATES PRESENCE IN URINE BY SCREEN METHOD: ABNORMAL
CANNABINOIDS IN URINE BY SCREEN METHOD: ABNORMAL
CHLORDIAZEPOXIDE: <25 NG/ML
CLONAZEPAM: <25 NG/ML
COCAINE (PRESENCE) IN URINE BY SCREEN METHOD: ABNORMAL
CODEINE: <50 NG/ML
CREATINE, URINE FOR DRUG: 143.2 MG/DL
DIAZEPAM: <25 NG/ML
DRUG SCREEN COMMENT URINE: ABNORMAL
EDDP: <25 NG/ML
FENTANYL CONFIRMATION, URINE: <2.5 NG/ML
HYDROCODONE: <25 NG/ML
HYDROMORPHONE: <25 NG/ML
LORAZEPAM: <25 NG/ML
METHADONE CONFIRMATION,URINE: <25 NG/ML
MIDAZOLAM: <25 NG/ML
MORPHINE URINE: <50 NG/ML
NORDIAZEPAM: <25 NG/ML
NORFENTANYL: <2.5 NG/ML
NORHYDROCODONE: <25 NG/ML
NOROXYCODONE: <25 NG/ML
O-DESMETHYLTRAMADOL: >1000 NG/ML
OXAZEPAM: <25 NG/ML
OXYCODONE: <25 NG/ML
OXYMORPHONE: <25 NG/ML
PHENCYCLIDINE (PRESENCE) IN URINE BY SCREEN METHOD: ABNORMAL
TEMAZEPAM: <25 NG/ML
TRAMADOL: 830 NG/ML
ZOLPIDEM METABOLITE (ZCA): <25 NG/ML
ZOLPIDEM: <25 NG/ML

## 2023-04-14 LAB
ALANINE AMINOTRANSFERASE (SGPT) (U/L) IN SER/PLAS: 39 U/L (ref 10–52)
ALBUMIN (G/DL) IN SER/PLAS: 4.3 G/DL (ref 3.4–5)
ALKALINE PHOSPHATASE (U/L) IN SER/PLAS: 80 U/L (ref 33–136)
ANION GAP IN SER/PLAS: 11 MMOL/L (ref 10–20)
ASPARTATE AMINOTRANSFERASE (SGOT) (U/L) IN SER/PLAS: 27 U/L (ref 9–39)
BASOPHILS (10*3/UL) IN BLOOD BY AUTOMATED COUNT: 0.03 X10E9/L (ref 0–0.1)
BASOPHILS/100 LEUKOCYTES IN BLOOD BY AUTOMATED COUNT: 0.5 % (ref 0–2)
BILIRUBIN TOTAL (MG/DL) IN SER/PLAS: 0.7 MG/DL (ref 0–1.2)
CALCIDIOL (25 OH VITAMIN D3) (NG/ML) IN SER/PLAS: 28 NG/ML
CALCIUM (MG/DL) IN SER/PLAS: 9.2 MG/DL (ref 8.6–10.3)
CARBON DIOXIDE, TOTAL (MMOL/L) IN SER/PLAS: 26 MMOL/L (ref 21–32)
CHLORIDE (MMOL/L) IN SER/PLAS: 106 MMOL/L (ref 98–107)
CHOLESTEROL (MG/DL) IN SER/PLAS: 189 MG/DL (ref 0–199)
CHOLESTEROL IN HDL (MG/DL) IN SER/PLAS: 47.5 MG/DL
CHOLESTEROL/HDL RATIO: 4
CREATININE (MG/DL) IN SER/PLAS: 0.96 MG/DL (ref 0.5–1.3)
EOSINOPHILS (10*3/UL) IN BLOOD BY AUTOMATED COUNT: 0.09 X10E9/L (ref 0–0.7)
EOSINOPHILS/100 LEUKOCYTES IN BLOOD BY AUTOMATED COUNT: 1.5 % (ref 0–6)
ERYTHROCYTE DISTRIBUTION WIDTH (RATIO) BY AUTOMATED COUNT: 12.8 % (ref 11.5–14.5)
ERYTHROCYTE MEAN CORPUSCULAR HEMOGLOBIN CONCENTRATION (G/DL) BY AUTOMATED: 32 G/DL (ref 32–36)
ERYTHROCYTE MEAN CORPUSCULAR VOLUME (FL) BY AUTOMATED COUNT: 91 FL (ref 80–100)
ERYTHROCYTES (10*6/UL) IN BLOOD BY AUTOMATED COUNT: 4.79 X10E12/L (ref 4.5–5.9)
ESTIMATED AVERAGE GLUCOSE FOR HBA1C: 140 MG/DL
GFR MALE: 90 ML/MIN/1.73M2
GLUCOSE (MG/DL) IN SER/PLAS: 112 MG/DL (ref 74–99)
HEMATOCRIT (%) IN BLOOD BY AUTOMATED COUNT: 43.7 % (ref 41–52)
HEMOGLOBIN (G/DL) IN BLOOD: 14 G/DL (ref 13.5–17.5)
HEMOGLOBIN A1C/HEMOGLOBIN TOTAL IN BLOOD: 6.5 %
IMMATURE GRANULOCYTES/100 LEUKOCYTES IN BLOOD BY AUTOMATED COUNT: 0.3 % (ref 0–0.9)
LDL: 127 MG/DL (ref 0–99)
LEUKOCYTES (10*3/UL) IN BLOOD BY AUTOMATED COUNT: 5.8 X10E9/L (ref 4.4–11.3)
LYMPHOCYTES (10*3/UL) IN BLOOD BY AUTOMATED COUNT: 1.32 X10E9/L (ref 1.2–4.8)
LYMPHOCYTES/100 LEUKOCYTES IN BLOOD BY AUTOMATED COUNT: 22.6 % (ref 13–44)
MONOCYTES (10*3/UL) IN BLOOD BY AUTOMATED COUNT: 0.37 X10E9/L (ref 0.1–1)
MONOCYTES/100 LEUKOCYTES IN BLOOD BY AUTOMATED COUNT: 6.3 % (ref 2–10)
NEUTROPHILS (10*3/UL) IN BLOOD BY AUTOMATED COUNT: 4.01 X10E9/L (ref 1.2–7.7)
NEUTROPHILS/100 LEUKOCYTES IN BLOOD BY AUTOMATED COUNT: 68.8 % (ref 40–80)
PLATELETS (10*3/UL) IN BLOOD AUTOMATED COUNT: 160 X10E9/L (ref 150–450)
POTASSIUM (MMOL/L) IN SER/PLAS: 4.4 MMOL/L (ref 3.5–5.3)
PROTEIN TOTAL: 6.5 G/DL (ref 6.4–8.2)
SODIUM (MMOL/L) IN SER/PLAS: 139 MMOL/L (ref 136–145)
TRIGLYCERIDE (MG/DL) IN SER/PLAS: 73 MG/DL (ref 0–149)
UREA NITROGEN (MG/DL) IN SER/PLAS: 17 MG/DL (ref 6–23)
VLDL: 15 MG/DL (ref 0–40)

## 2023-05-08 DIAGNOSIS — G89.29 CHRONIC BILATERAL LOW BACK PAIN, UNSPECIFIED WHETHER SCIATICA PRESENT: ICD-10-CM

## 2023-05-08 DIAGNOSIS — M54.50 CHRONIC BILATERAL LOW BACK PAIN, UNSPECIFIED WHETHER SCIATICA PRESENT: ICD-10-CM

## 2023-05-08 DIAGNOSIS — K21.9 GASTROESOPHAGEAL REFLUX DISEASE WITHOUT ESOPHAGITIS: ICD-10-CM

## 2023-05-10 RX ORDER — TRAMADOL HYDROCHLORIDE 50 MG/1
50-100 TABLET ORAL EVERY 12 HOURS PRN
Qty: 60 TABLET | Refills: 0 | Status: SHIPPED | OUTPATIENT
Start: 2023-05-10 | End: 2023-06-08 | Stop reason: SDUPTHER

## 2023-05-10 RX ORDER — TRAMADOL HYDROCHLORIDE 50 MG/1
50-100 TABLET ORAL EVERY 12 HOURS PRN
Qty: 60 TABLET | Refills: 0 | OUTPATIENT
Start: 2023-05-10 | End: 2023-06-09

## 2023-05-10 RX ORDER — ESOMEPRAZOLE MAGNESIUM 40 MG/1
40 CAPSULE, DELAYED RELEASE ORAL DAILY
Qty: 90 CAPSULE | Refills: 0 | Status: SHIPPED | OUTPATIENT
Start: 2023-05-10 | End: 2023-07-05 | Stop reason: SDUPTHER

## 2023-05-10 NOTE — TELEPHONE ENCOUNTER
RX for tramadol was refilled.    Advise pt that RX was sent and I apologize as I did not recall he was seen since we started in the new system and was looking in the old system only.

## 2023-06-08 DIAGNOSIS — M54.50 CHRONIC BILATERAL LOW BACK PAIN, UNSPECIFIED WHETHER SCIATICA PRESENT: ICD-10-CM

## 2023-06-08 DIAGNOSIS — G89.29 CHRONIC BILATERAL LOW BACK PAIN, UNSPECIFIED WHETHER SCIATICA PRESENT: ICD-10-CM

## 2023-06-09 RX ORDER — TRAMADOL HYDROCHLORIDE 50 MG/1
50-100 TABLET ORAL EVERY 12 HOURS PRN
Qty: 60 TABLET | Refills: 0 | Status: SHIPPED | OUTPATIENT
Start: 2023-06-09 | End: 2023-07-05 | Stop reason: SDUPTHER

## 2023-07-05 ENCOUNTER — OFFICE VISIT (OUTPATIENT)
Dept: PRIMARY CARE | Facility: CLINIC | Age: 63
End: 2023-07-05
Payer: COMMERCIAL

## 2023-07-05 VITALS
TEMPERATURE: 98.2 F | BODY MASS INDEX: 39.68 KG/M2 | DIASTOLIC BLOOD PRESSURE: 78 MMHG | SYSTOLIC BLOOD PRESSURE: 124 MMHG | HEIGHT: 72 IN | RESPIRATION RATE: 18 BRPM | HEART RATE: 84 BPM | OXYGEN SATURATION: 94 % | WEIGHT: 293 LBS

## 2023-07-05 DIAGNOSIS — D69.6 THROMBOCYTOPENIA (CMS-HCC): ICD-10-CM

## 2023-07-05 DIAGNOSIS — E11.9 TYPE 2 DIABETES MELLITUS WITHOUT COMPLICATION, WITHOUT LONG-TERM CURRENT USE OF INSULIN (MULTI): ICD-10-CM

## 2023-07-05 DIAGNOSIS — M54.50 CHRONIC BILATERAL LOW BACK PAIN, UNSPECIFIED WHETHER SCIATICA PRESENT: Primary | ICD-10-CM

## 2023-07-05 DIAGNOSIS — G47.61 PERIODIC LIMB MOVEMENT: ICD-10-CM

## 2023-07-05 DIAGNOSIS — K21.9 GASTROESOPHAGEAL REFLUX DISEASE WITHOUT ESOPHAGITIS: ICD-10-CM

## 2023-07-05 DIAGNOSIS — G89.29 CHRONIC BILATERAL LOW BACK PAIN, UNSPECIFIED WHETHER SCIATICA PRESENT: Primary | ICD-10-CM

## 2023-07-05 DIAGNOSIS — E78.2 MIXED HYPERLIPIDEMIA: ICD-10-CM

## 2023-07-05 DIAGNOSIS — F17.200 CURRENT SMOKER: ICD-10-CM

## 2023-07-05 DIAGNOSIS — G47.33 OSA (OBSTRUCTIVE SLEEP APNEA): ICD-10-CM

## 2023-07-05 DIAGNOSIS — E55.9 VITAMIN D DEFICIENCY: ICD-10-CM

## 2023-07-05 DIAGNOSIS — K76.0 NAFLD (NONALCOHOLIC FATTY LIVER DISEASE): ICD-10-CM

## 2023-07-05 DIAGNOSIS — R33.8 BENIGN PROSTATIC HYPERPLASIA WITH URINARY RETENTION: ICD-10-CM

## 2023-07-05 DIAGNOSIS — N40.1 BENIGN PROSTATIC HYPERPLASIA WITH URINARY RETENTION: ICD-10-CM

## 2023-07-05 PROCEDURE — 3074F SYST BP LT 130 MM HG: CPT | Performed by: FAMILY MEDICINE

## 2023-07-05 PROCEDURE — 3044F HG A1C LEVEL LT 7.0%: CPT | Performed by: FAMILY MEDICINE

## 2023-07-05 PROCEDURE — 99214 OFFICE O/P EST MOD 30 MIN: CPT | Performed by: FAMILY MEDICINE

## 2023-07-05 PROCEDURE — 3078F DIAST BP <80 MM HG: CPT | Performed by: FAMILY MEDICINE

## 2023-07-05 RX ORDER — OXYBUTYNIN CHLORIDE 5 MG/1
5 TABLET ORAL 2 TIMES DAILY
Qty: 180 TABLET | Refills: 0 | Status: SHIPPED | OUTPATIENT
Start: 2023-07-05 | End: 2023-10-04 | Stop reason: SDUPTHER

## 2023-07-05 RX ORDER — TAMSULOSIN HYDROCHLORIDE 0.4 MG/1
0.4 CAPSULE ORAL
Qty: 90 CAPSULE | Refills: 0 | Status: SHIPPED | OUTPATIENT
Start: 2023-07-05 | End: 2023-10-04 | Stop reason: SDUPTHER

## 2023-07-05 RX ORDER — CLINDAMYCIN HYDROCHLORIDE 300 MG/1
300 CAPSULE ORAL
COMMUNITY
End: 2023-10-04 | Stop reason: WASHOUT

## 2023-07-05 RX ORDER — DUTASTERIDE 0.5 MG/1
0.5 CAPSULE, LIQUID FILLED ORAL DAILY
Qty: 90 CAPSULE | Refills: 0 | Status: SHIPPED | OUTPATIENT
Start: 2023-07-05 | End: 2023-10-04 | Stop reason: SDUPTHER

## 2023-07-05 RX ORDER — ESOMEPRAZOLE MAGNESIUM 40 MG/1
40 CAPSULE, DELAYED RELEASE ORAL DAILY
Qty: 90 CAPSULE | Refills: 0 | Status: SHIPPED | OUTPATIENT
Start: 2023-07-05 | End: 2023-10-04 | Stop reason: SDUPTHER

## 2023-07-05 RX ORDER — ROPINIROLE 0.5 MG/1
0.5 TABLET, FILM COATED ORAL NIGHTLY
Qty: 90 TABLET | Refills: 0 | Status: SHIPPED | OUTPATIENT
Start: 2023-07-05 | End: 2023-10-04 | Stop reason: SDUPTHER

## 2023-07-05 RX ORDER — TRAMADOL HYDROCHLORIDE 50 MG/1
50-100 TABLET ORAL EVERY 12 HOURS PRN
Qty: 60 TABLET | Refills: 0 | Status: SHIPPED | OUTPATIENT
Start: 2023-07-05 | End: 2023-08-03 | Stop reason: SDUPTHER

## 2023-07-05 NOTE — PATIENT INSTRUCTIONS
SCHEDULE THE CPAP TITRATION TEST.    WORK ON REDUCING THE SUGAR AND STARCHES IN YOUR DIET.    Follow up in 3 months with labs to be done PRIOR.    It was a pleasure to see you today. Thank you for choosing us for your health care needs.    If you have lab or other testing completed and have not been informed of results within one week, please call the office for your results.    If you haven't done so, consider signing up for [facility] [email or web address], the [facility] personal health record. Ask the staff how you can get started.

## 2023-07-05 NOTE — PROGRESS NOTES
Subjective   Patient ID: Ivan Johnson is a 62 y.o. male who presents for a 3 month follow up monitoring and management of multiple medical conditions including sleep apnea, BPH with urinary retention, chronic back pain, tobacco abuse, GERD, hyperlipidemia, NAFLD, periodic limb movement disorder, and vitamin D deficiency.    HPI           LABS COMPLETED ON 4/14/23.         Patient has hyperlipidemia.  Patient tries to limit the amount of fatty foods and high cholesterol foods that he consumes  His hyperlipidemia is currently treated with dietary/ lifestyle changes.      He has GERD.  He was treated with Esomeprazole.   He denies any breakthrough reflux symptoms.      Patient has obstructive sleep apnea.  Patient utilizes a CPAP machine on a nightly basis.  He is tolerating the machine well and does feel well rested in the mornings.  7/5/23: PT STATES HIS MACHINE WILL SHUT OFF DURING THE NIGHT, HE HAS NOT CALLED THE COMPANY YET.     He has periodic leg movement disorder.  Symptoms were stable when he was on Requip.     He has chronic lower back pain and hip pain.  This has been effectively treated with Tramadol as needed.  NSAIDs have not provided adequate relief in the past.  UDS/CSC completed 4/5/23.   7/5/23: HE STATES THE PAIN IS WORSENING AND THIS IS CAUSING DIFFICULTY PERFORMING HIS DUTIES AT WORK.     He has a history of BPH/urinary frequency/urinary retention.  Symptoms have been well-controlled on the current combination of oxybutynin and Flomax.      He has nonalcoholic fatty liver disease.  Pt underwent laboratory evaluation as well as an ultrasound of the liver in the past.  Patient has not had a liver biopsy performed.      He has vitamin D deficiency.  Patient continues to take over-the-counter vitamin D supplementation of 1000 units daily.     OARRS:  Sushant Wiseman DO on 7/5/2023  6:47 PM  I have personally reviewed the OARRS report for Ivan Johnson. I have considered the risks of abuse,  dependence, addiction and diversion    Is the patient prescribed a combination of a benzodiazepine and opioid?  No    Last Urine Drug Screen / ordered today: Yes  Recent Results (from the past 19632 hour(s))   OPIATE/OPIOID/BENZO PRESCRIPTION COMPLIANCE    Collection Time: 04/06/23 11:08 AM   Result Value Ref Range    DRUG SCREEN COMMENT URINE SEE BELOW     Creatine, Urine 143.2 mg/dL    Amphetamine Screen, Urine PRESUMPTIVE NEGATIVE NEGATIVE    Barbiturate Screen, Urine PRESUMPTIVE NEGATIVE NEGATIVE    Cannabinoid Screen, Urine PRESUMPTIVE NEGATIVE NEGATIVE    Cocaine Screen, Urine PRESUMPTIVE NEGATIVE NEGATIVE    PCP Screen, Urine PRESUMPTIVE NEGATIVE NEGATIVE    7-Aminoclonazepam <25 Cutoff <25 ng/mL    Alpha-Hydroxyalprazolam <25 Cutoff <25 ng/mL    Alpha-Hydroxymidazolam <25 Cutoff <25 ng/mL    Alprazolam <25 Cutoff <25 ng/mL    Chlordiazepoxide <25 Cutoff <25 ng/mL    Clonazepam <25 Cutoff <25 ng/mL    Diazepam <25 Cutoff <25 ng/mL    Lorazepam <25 Cutoff <25 ng/mL    Midazolam <25 Cutoff <25 ng/mL    Nordiazepam <25 Cutoff <25 ng/mL    Oxazepam <25 Cutoff <25 ng/mL    Temazepam <25 Cutoff <25 ng/mL    Zolpidem <25 Cutoff <25 ng/mL    Zolpidem Metabolite (ZCA) <25 Cutoff <25 ng/mL    6-Acetylmorphine <25 Cutoff <25 ng/mL    Codeine <50 Cutoff <50 ng/mL    Hydrocodone <25 Cutoff <25 ng/mL    Hydromorphone <25 Cutoff <25 ng/mL    Morphine Urine <50 Cutoff <50 ng/mL    Norhydrocodone <25 Cutoff <25 ng/mL    Noroxycodone <25 Cutoff <25 ng/mL    Oxycodone <25 Cutoff <25 ng/mL    Oxymorphone <25 Cutoff <25 ng/mL    Tramadol 830 (A) Cutoff <50 ng/mL    O-Desmethyltramadol >1000 (A) Cutoff <50 ng/mL    Fentanyl <2.5 Cutoff<2.5 ng/mL    Norfentanyl <2.5 Cutoff<2.5 ng/mL    METHADONE CONFIRMATION,URINE <25 Cutoff <25 ng/mL    EDDP <25 Cutoff <25 ng/mL   Drug Screen, Urine With Reflex to Confirmation    Collection Time: 03/24/22  1:00 PM   Result Value Ref Range    DRUG SCREEN COMMENT URINE SEE BELOW     Amphetamine  Screen, Urine PRESUMPTIVE NEGATIVE NEGATIVE    Barbiturate Screen, Urine PRESUMPTIVE NEGATIVE NEGATIVE    BENZODIAZEPINE (PRESENCE) IN URINE BY SCREEN METHOD PRESUMPTIVE NEGATIVE NEGATIVE    Cannabinoid Screen, Urine PRESUMPTIVE NEGATIVE NEGATIVE    Cocaine Screen, Urine PRESUMPTIVE NEGATIVE NEGATIVE    Fentanyl, Ur PRESUMPTIVE NEGATIVE NEGATIVE    Methadone Screen, Urine PRESUMPTIVE NEGATIVE NEGATIVE    Opiate Screen, Urine PRESUMPTIVE NEGATIVE NEGATIVE    Oxycodone Screen, Ur PRESUMPTIVE NEGATIVE NEGATIVE    PCP Screen, Urine PRESUMPTIVE NEGATIVE NEGATIVE     Results are as expected.     Controlled Substance Agreement:  Date of the Last Agreement: 4/5/2023  Reviewed Controlled Substance Agreement including but not limited to the benefits, risks, and alternatives to treatment with a Controlled Substance medication(s).    Opioids:  What is the patient's goal of therapy? Reduce chronic pain  Is this being achieved with current treatment? yes    I have calculated the patient's Morphine Dose Equivalent (MED):   I have considered referral to Pain Management and/or a specialist, and do not feel it is necessary at this time.    I feel that it is clinically indicated to continue this current medication regimen after consideration of alternative therapies, and other non-opioid treatment.    Opioid Risk Screening:  Family History of Substance Abuse  Alcohol: 0 (4/5/2023  6:00 PM)  Illegal Drugs: 0 (4/5/2023  6:00 PM)  Prescription Drugs: 0 (4/5/2023  6:00 PM)    Personal History of Substance Abuse  Alcohol: 0 (4/5/2023  6:00 PM)  Illegal Drugs: 0 (4/5/2023  6:00 PM)  Prescription Drugs: 0 (4/5/2023  6:00 PM)    Patient Age (16-45)  Age (16-45): 0 (4/5/2023  6:00 PM)    History of Preadolescent Sexual Abuse  History of Preadolescent Sexual Abuse: .0 (4/5/2023  6:00 PM)    Psychological Disease  Attention Deficit Disorder, Obsessive Compulsive Disorder, Bipolar, Schizophrenia: 0 (4/5/2023  6:00 PM)  Depression: 0 (4/5/2023   6:00 PM)    Total Score  Total Score: 0 (4/5/2023  6:00 PM)    Total Score Risk Category  TOTAL SCORE CATEGORY: Low Risk (0-3) (4/5/2023  6:00 PM)        Pain Assessment:  Analgesia  What was your pain level on average during the past week?: 3  What was your pain level at its worst during the past week?: 8  What percentage of your pain has been relieved during the past week?: 35 %  Is the amount of pain relief you are now obtaining from your current pain relievers enough to make a real difference in your life?: Y  Query to Clinician: Is the patient's pain relief clinically significant?: Yes    Activities of Daily Living  Physical Functioning: Worse  Family Relationships: Same  Social Relationships: Same  Mood: Same  Sleep Patterns: Same  Overall Functioning: Worse    Adverse Events  Is patient experiencing any side effects from current pain relievers?: N  Patient's Overall Severity of Side Effects: None      Assessment  Is your overall impression that this patient is benefiting from opioid therapy?: Yes  Specific Analgesic Plan: Continue present regimen          Review of Systems: Constitutional: Patient denies any fever, chills, loss of appetite, or unexplained weight loss.  Cardiovascular: Patient denies any chest pain, shortness of breath with exertion, tachycardia, palpitations, orthopnea, or paroxysmal nocturnal dyspnea.  Respiratory: Patient denies any cough, shortness breath, or wheezing.  Gastrointestinal patient denies any nausea, vomiting, diarrhea, constipation, melena, hematochezia, or reflux symptoms.  Skin: Denies any rashes or skin lesions   Neurology: Patient denies any new motor or sensory losses. Denies any numbness, tingling, weakness, and incoordination of the extremities. Patient also denies any tremor, seizures, or gait instability.  Endocrinology: Denies any polyuria, polydipsia, polyphagia, or heat/cold intolerance     Musculoskeletal: Patient denies any joint swelling, or joint deformity  .  He does complain of chronic back and hip pain.   7/5/2023: PATIENT REPORTS THAT SYMPTOMS ARE WORSENING OVER TIME.     SEE HISTORY OF PRESENT ILLNESS ALSO.    Objective   /78   Pulse 84   Temp 36.8 °C (98.2 °F)   Resp 18   Ht 1.829 m (6')   Wt 133 kg (293 lb)   SpO2 94%   BMI 39.74 kg/m²     Physical Exam: Gen. appearance: Alert and cooperative, no acute distress, well-developed/well-nourished obese male.      Neck: Supple and without adenopathy, no JVD at 90° and no carotid bruits are noted. There is no thyromegaly, thyroid tenderness, or palpable thyroid nodules.  Cardiovascular: Regular rate and rhythm without murmur or ectopy. Pedal pulses normal.   Respiratory: Clear to auscultation bilaterally with good air exchange.  Skin: Good turgor, moist, warm and without rashes or lesions.  Neurological exam: Alert and oriented x3, no tremor, normal gait.     Musculoskeletal: No noted joint effusions or gross bony deformities.  There is mild bilateral lumbar paraspinal muscle tenderness without palpable spasm.    Assessment/Plan     Chronic back pain: Patient will continue with tramadol for pain as needed.  NSAIDs have been ineffective for his pain in the past.      Hyperlipidemia: Dietary changes, exercise, and weight loss were encouraged.  We will continue to monitor.     DM Type 2: Stable based on symptoms.  He is not experiencing any polyuria, polydipsia, polyphagia.  Dietary changes, exercise, and maintenance of a healthy weight were discussed at length.  4/14/2023 A1c was 6.5 %.    Vitamin D deficiency: Stable based on last vitamin D labs.  Patient to continue vitamin D supplementation of 2000 units daily.      NAFLD: He was encouraged to follow a low-fat diet and maintain a healthy weight.     Periodic leg movement disorder: Patient to continue Requip at current dose.  The medication previously provided adequate relief of his symptoms.     BPH / urinary retention: Patient to continue tamsulosin and  oxybutynin at current dosage.     Sleep apnea: Stable.   We will continue with his CPAP therapy.   He continues to benefit from the CPAP therapy.  7/5/2023: CPAP Titration test ordered today.     Current smoker:   Pt has been encouraged to work on smoking cessation and available smoking cessation aids were discussed.  The importance of smoking cessation was discussed.   Patient understands that continued smoking will increase the risks of lung disease, vascular disease, and multiple malignancies.      GERD: Stable with the current medication and appropriate dietary changes.  Patient to continue Esomeprazole once daily.      Thrombocytopenia:  Stable based on last labs.  We will continue to monitor.      Orders Placed This Encounter   Procedures    Basic Metabolic Panel    Hemoglobin A1C    Lipid Panel    Vitamin D, Total    In-Center Sleep Study (Non-Sleep Provider Only)     Requested Prescriptions     Signed Prescriptions Disp Refills    tamsulosin (Flomax) 0.4 mg 24 hr capsule 90 capsule 0     Sig: Take 1 capsule (0.4 mg) by mouth once daily.    rOPINIRole (Requip) 0.5 mg tablet 90 tablet 0     Sig: Take 1 tablet (0.5 mg) by mouth once daily at bedtime.    oxybutynin (Ditropan) 5 mg tablet 180 tablet 0     Sig: Take 1 tablet (5 mg) by mouth 2 times a day.    esomeprazole (NexIUM) 40 mg DR capsule 90 capsule 0     Sig: Take 1 capsule (40 mg) by mouth once daily.    dutasteride (Avodart) 0.5 mg capsule 90 capsule 0     Sig: Take 1 capsule (0.5 mg) by mouth once daily.    traMADol (Ultram) 50 mg tablet 60 tablet 0     Sig: Take 1-2 tablets ( mg) by mouth every 12 hours if needed for severe pain (7 - 10).               Scribe Attestation  By signing my name below, ISanchez , Scribaustyn   attest that this documentation has been prepared under the direction and in the presence of Dr. Wiseman.

## 2023-08-03 DIAGNOSIS — M54.50 CHRONIC BILATERAL LOW BACK PAIN, UNSPECIFIED WHETHER SCIATICA PRESENT: ICD-10-CM

## 2023-08-03 DIAGNOSIS — G89.29 CHRONIC BILATERAL LOW BACK PAIN, UNSPECIFIED WHETHER SCIATICA PRESENT: ICD-10-CM

## 2023-08-04 RX ORDER — TRAMADOL HYDROCHLORIDE 50 MG/1
50-100 TABLET ORAL EVERY 12 HOURS PRN
Qty: 60 TABLET | Refills: 0 | Status: SHIPPED | OUTPATIENT
Start: 2023-08-04 | End: 2023-08-28 | Stop reason: SDUPTHER

## 2023-08-28 DIAGNOSIS — M54.50 CHRONIC BILATERAL LOW BACK PAIN, UNSPECIFIED WHETHER SCIATICA PRESENT: ICD-10-CM

## 2023-08-28 DIAGNOSIS — G89.29 CHRONIC BILATERAL LOW BACK PAIN, UNSPECIFIED WHETHER SCIATICA PRESENT: ICD-10-CM

## 2023-08-28 NOTE — TELEPHONE ENCOUNTER
Patient needed a refill on tramdol, he also wanted to let you know he got his sleep study done and is requesting if you can send in a rx for a new cpap machine as he has been having problems with his current one, please advise

## 2023-08-29 DIAGNOSIS — G47.33 OSA (OBSTRUCTIVE SLEEP APNEA): Primary | ICD-10-CM

## 2023-08-29 RX ORDER — TRAMADOL HYDROCHLORIDE 50 MG/1
50-100 TABLET ORAL EVERY 12 HOURS PRN
Qty: 60 TABLET | Refills: 0 | Status: SHIPPED | OUTPATIENT
Start: 2023-08-29 | End: 2023-10-04 | Stop reason: SDUPTHER

## 2023-08-29 NOTE — TELEPHONE ENCOUNTER
It was sent to [employer] service company since the other one does not do those any longer

## 2023-08-29 NOTE — TELEPHONE ENCOUNTER
Advise pt that a refill was sent and we will order the new CPAP.  Just need to clarify where he wants to get it from and fax it to them.  Order was printed.

## 2023-09-15 ENCOUNTER — LAB (OUTPATIENT)
Dept: LAB | Facility: LAB | Age: 63
End: 2023-09-15
Payer: COMMERCIAL

## 2023-09-15 DIAGNOSIS — E11.9 TYPE 2 DIABETES MELLITUS WITHOUT COMPLICATION, WITHOUT LONG-TERM CURRENT USE OF INSULIN (MULTI): ICD-10-CM

## 2023-09-15 DIAGNOSIS — R73.01 IMPAIRED FASTING GLUCOSE: ICD-10-CM

## 2023-09-15 DIAGNOSIS — E78.2 MIXED HYPERLIPIDEMIA: ICD-10-CM

## 2023-09-15 DIAGNOSIS — Z12.5 PROSTATE CANCER SCREENING: ICD-10-CM

## 2023-09-15 DIAGNOSIS — E55.9 VITAMIN D DEFICIENCY: ICD-10-CM

## 2023-09-15 LAB
ANION GAP IN SER/PLAS: 11 MMOL/L (ref 10–20)
CALCIDIOL (25 OH VITAMIN D3) (NG/ML) IN SER/PLAS: 28 NG/ML
CALCIUM (MG/DL) IN SER/PLAS: 9.3 MG/DL (ref 8.6–10.3)
CARBON DIOXIDE, TOTAL (MMOL/L) IN SER/PLAS: 25 MMOL/L (ref 21–32)
CHLORIDE (MMOL/L) IN SER/PLAS: 104 MMOL/L (ref 98–107)
CHOLESTEROL (MG/DL) IN SER/PLAS: 190 MG/DL (ref 0–199)
CHOLESTEROL IN HDL (MG/DL) IN SER/PLAS: 42.7 MG/DL
CHOLESTEROL/HDL RATIO: 4.4
CREATININE (MG/DL) IN SER/PLAS: 0.93 MG/DL (ref 0.5–1.3)
ESTIMATED AVERAGE GLUCOSE FOR HBA1C: 140 MG/DL
GFR MALE: >90 ML/MIN/1.73M2
GLUCOSE (MG/DL) IN SER/PLAS: 132 MG/DL (ref 74–99)
HEMOGLOBIN A1C/HEMOGLOBIN TOTAL IN BLOOD: 6.5 %
LDL: 134 MG/DL (ref 0–99)
POTASSIUM (MMOL/L) IN SER/PLAS: 4.3 MMOL/L (ref 3.5–5.3)
PROSTATE SPECIFIC ANTIGEN,SCREEN: 1.5 NG/ML (ref 0–4)
SODIUM (MMOL/L) IN SER/PLAS: 136 MMOL/L (ref 136–145)
TRIGLYCERIDE (MG/DL) IN SER/PLAS: 68 MG/DL (ref 0–149)
UREA NITROGEN (MG/DL) IN SER/PLAS: 14 MG/DL (ref 6–23)
VLDL: 14 MG/DL (ref 0–40)

## 2023-09-15 PROCEDURE — 82306 VITAMIN D 25 HYDROXY: CPT

## 2023-09-15 PROCEDURE — 36415 COLL VENOUS BLD VENIPUNCTURE: CPT

## 2023-09-15 PROCEDURE — 80048 BASIC METABOLIC PNL TOTAL CA: CPT

## 2023-09-15 PROCEDURE — 84153 ASSAY OF PSA TOTAL: CPT

## 2023-09-15 PROCEDURE — 80061 LIPID PANEL: CPT

## 2023-09-15 PROCEDURE — 83036 HEMOGLOBIN GLYCOSYLATED A1C: CPT

## 2023-09-18 ENCOUNTER — TELEPHONE (OUTPATIENT)
Dept: PRIMARY CARE | Facility: CLINIC | Age: 63
End: 2023-09-18
Payer: COMMERCIAL

## 2023-09-18 NOTE — TELEPHONE ENCOUNTER
New CPAP machine was ordered on 8/29/2023.    Clarify where the RX was to go and fax the order again.  Pt will need to call the supplier to follow up on when he can expect to get the CPAP machine.    Mail pt a copy of the order so he has one as well in case he needs.

## 2023-09-18 NOTE — TELEPHONE ENCOUNTER
Patient is calling for his sleep study has still yet to receive his Cpap machine. Looks like he only has done the diagnostic sleep study.

## 2023-10-03 NOTE — TELEPHONE ENCOUNTER
[facility]    PROCEDURE NOTE    Procedure: Botulinum Injection  Primary Dx:   1. Chronic migraine without aura with status migrainosus, not intractable  onabotulinum toxin A (Botox) injection 155 Units          SUBJECTIVE:  Botox has been successful in reducing the intensity and frequency of migraines.  Denies recent illnesses or use of antibiotics.  Denies any significant negative side effects from the injections.     OBJECTIVE:  Vitals:    10/03/23 0753   BP: 104/66   Pulse: 96   Temp: 36.7 °C (98.1 °F)   SpO2: 97%       ASSESSMENT&PLAN:  Botox therapy has reduced patient’s migraines by more than 7 days and/or 100 hours per month.  Although this patient is responding to botox, the patient is  NOT migraine free, however, and it is my recommendation Botox be continued at this time.  Frequency of headaches is >15 days monthly with at least 8 migraines monthly; Migraines include at least two of the following: worsened with activity or avoidance of activity with migraines (ie they go lie down), moderate to severe pain intensity, pulsing headache, unilateral headache and has  have either nausea or vomiting OR sensitivity to light and sound.    INFORMED CONSENT   The risks and benefits of the procedure were explained to the patient, and all questions were answered. Benefits of the injection include spasticity reduction by decrease in muscle activation following toxin injection. Adverse effects from toxin injection include but are not limited to: excessive weakness, infection, breathing and/or swallowing difficulty.  Common adverse effects from the injection itself are pain and bruising. The patient demonstrated good understanding of risks and benefits and consents to botulinum toxin injection.     Pre-procedure time out was performed.     PROCEDURE:  After clean preparation of skin using an alcohol swab, a needle was inserted into the muscle(s) listed below.         10  Pt aware    units divided in 2 sites   Procerus  5 units in 1 site   Frontalis  20 units divided in 4 sites   Temporalis  40 units divided into 8 sites   Occipitalis  30 units divided into 6 sites   Cervical paraspinals  20 units divided into 4 sites   Trapezius 30 units divided into 6 sites     I treated this patient in clinic today with BotoxA 155 units according to the dosing/injection paradigm currently mandated by the FDA for the management of chronic migraine. Specifically, I injected 5 units to the procerus, 5 units to the corrugators bilaterally, a total of 20 units to the frontalis musculature, 20 units to the temporalis bilaterally, 15 units to the occipitalis bilaterally, 10 units to the cervical paraspinals bilaterally and 15 units to the trapezius musculature bilaterally. The remainder of the Botox 200 units mixed but not administered was discarded as wastage per FDA guidelines  Consent on file.  Patient identify verified with 2 patient identifiers.    Injection sites were cleaned with alcohol and band aid was applied afterwards.  The patient tolerated the procedure well.  There were no complications.     MEDICATION USED:  200 units of botulinum toxin type A, mixed with 4 cc of sterile, preservative-free normal saline in a 4 separate 1 cc syringes.    Total units injected: 155 units  Total units discarded: 45 units    This patient has chronic migraines, defined as having 15 or more headaches days per month, 8 of which are migraines, over a minimum of the last three months. Episodes last more than 4 hours (untreated).  Pt has 2 or more of following (see initial note) -  Headache worsened with activity, pain is moderate to severe intensity, pulsing in nature, unilateral and patient either has nausea/vomiting OR sensitivity to light and sound. This patient does not also have medication overuse headache.    No adverse effect of Botox noted at conclusion of today's appointment. Pt was instructed to seek immediate  medical attention if fever, difficulty breathing, difficulty swallowing, or other concerning sxs arise. Counseled patient not to rub face, exercise, or use heat for at least 24 hours to prevent spreading of medication. Counseled that while every attempt possible is made to avoid minor and major blood vessels, mild bruising can occur due to injection and should resolve within 5-7 days.    RTC in 3 months for repeat injection, or sooner if concerns arise.     Issa Walls MD  Neurology Attending, Headache Program  [facility]

## 2023-10-04 ENCOUNTER — OFFICE VISIT (OUTPATIENT)
Dept: PRIMARY CARE | Facility: CLINIC | Age: 63
End: 2023-10-04
Payer: COMMERCIAL

## 2023-10-04 VITALS
HEIGHT: 73 IN | DIASTOLIC BLOOD PRESSURE: 72 MMHG | SYSTOLIC BLOOD PRESSURE: 120 MMHG | HEART RATE: 75 BPM | BODY MASS INDEX: 39.52 KG/M2 | TEMPERATURE: 98.7 F | WEIGHT: 298.2 LBS | OXYGEN SATURATION: 96 %

## 2023-10-04 DIAGNOSIS — E11.9 TYPE 2 DIABETES MELLITUS WITHOUT COMPLICATION, WITHOUT LONG-TERM CURRENT USE OF INSULIN (MULTI): ICD-10-CM

## 2023-10-04 DIAGNOSIS — R33.8 BENIGN PROSTATIC HYPERPLASIA WITH URINARY RETENTION: ICD-10-CM

## 2023-10-04 DIAGNOSIS — M54.50 CHRONIC BILATERAL LOW BACK PAIN, UNSPECIFIED WHETHER SCIATICA PRESENT: Primary | ICD-10-CM

## 2023-10-04 DIAGNOSIS — K21.9 GASTROESOPHAGEAL REFLUX DISEASE WITHOUT ESOPHAGITIS: ICD-10-CM

## 2023-10-04 DIAGNOSIS — G47.33 OSA (OBSTRUCTIVE SLEEP APNEA): ICD-10-CM

## 2023-10-04 DIAGNOSIS — R33.9 URINARY RETENTION: ICD-10-CM

## 2023-10-04 DIAGNOSIS — N40.1 BENIGN PROSTATIC HYPERPLASIA WITH URINARY RETENTION: ICD-10-CM

## 2023-10-04 DIAGNOSIS — G47.61 PERIODIC LIMB MOVEMENT: ICD-10-CM

## 2023-10-04 DIAGNOSIS — E55.9 VITAMIN D DEFICIENCY: ICD-10-CM

## 2023-10-04 DIAGNOSIS — E78.2 MIXED HYPERLIPIDEMIA: ICD-10-CM

## 2023-10-04 DIAGNOSIS — G89.29 CHRONIC BILATERAL LOW BACK PAIN, UNSPECIFIED WHETHER SCIATICA PRESENT: Primary | ICD-10-CM

## 2023-10-04 DIAGNOSIS — K76.0 NAFLD (NONALCOHOLIC FATTY LIVER DISEASE): ICD-10-CM

## 2023-10-04 DIAGNOSIS — F17.200 CURRENT SMOKER: ICD-10-CM

## 2023-10-04 PROCEDURE — 3074F SYST BP LT 130 MM HG: CPT | Performed by: FAMILY MEDICINE

## 2023-10-04 PROCEDURE — 3044F HG A1C LEVEL LT 7.0%: CPT | Performed by: FAMILY MEDICINE

## 2023-10-04 PROCEDURE — 3078F DIAST BP <80 MM HG: CPT | Performed by: FAMILY MEDICINE

## 2023-10-04 PROCEDURE — 99214 OFFICE O/P EST MOD 30 MIN: CPT | Performed by: FAMILY MEDICINE

## 2023-10-04 RX ORDER — PREDNISONE 50 MG/1
50 TABLET ORAL DAILY
COMMUNITY
Start: 2023-08-11 | End: 2023-10-04 | Stop reason: WASHOUT

## 2023-10-04 RX ORDER — TRAMADOL HYDROCHLORIDE 50 MG/1
50-100 TABLET ORAL EVERY 12 HOURS PRN
Qty: 60 TABLET | Refills: 0 | Status: SHIPPED | OUTPATIENT
Start: 2023-10-04 | End: 2023-11-01 | Stop reason: SDUPTHER

## 2023-10-04 RX ORDER — OXYBUTYNIN CHLORIDE 5 MG/1
5 TABLET ORAL 2 TIMES DAILY
Qty: 180 TABLET | Refills: 0 | Status: CANCELLED | OUTPATIENT
Start: 2023-10-04 | End: 2024-01-02

## 2023-10-04 RX ORDER — ROPINIROLE 0.5 MG/1
0.5 TABLET, FILM COATED ORAL NIGHTLY
Qty: 90 TABLET | Refills: 0 | Status: CANCELLED | OUTPATIENT
Start: 2023-10-04 | End: 2024-01-02

## 2023-10-04 RX ORDER — TAMSULOSIN HYDROCHLORIDE 0.4 MG/1
0.4 CAPSULE ORAL
Qty: 90 CAPSULE | Refills: 0 | Status: SHIPPED | OUTPATIENT
Start: 2023-10-04 | End: 2024-01-03 | Stop reason: SDUPTHER

## 2023-10-04 RX ORDER — ESOMEPRAZOLE MAGNESIUM 40 MG/1
40 CAPSULE, DELAYED RELEASE ORAL DAILY
Qty: 90 CAPSULE | Refills: 0 | Status: SHIPPED | OUTPATIENT
Start: 2023-10-04 | End: 2024-01-03 | Stop reason: SDUPTHER

## 2023-10-04 RX ORDER — ESOMEPRAZOLE MAGNESIUM 40 MG/1
40 CAPSULE, DELAYED RELEASE ORAL DAILY
Qty: 90 CAPSULE | Refills: 0 | Status: CANCELLED | OUTPATIENT
Start: 2023-10-04 | End: 2024-01-02

## 2023-10-04 RX ORDER — TAMSULOSIN HYDROCHLORIDE 0.4 MG/1
0.4 CAPSULE ORAL
Qty: 90 CAPSULE | Refills: 0 | Status: CANCELLED | OUTPATIENT
Start: 2023-10-04 | End: 2024-01-02

## 2023-10-04 RX ORDER — ROPINIROLE 0.5 MG/1
0.5 TABLET, FILM COATED ORAL NIGHTLY
Qty: 90 TABLET | Refills: 0 | Status: SHIPPED | OUTPATIENT
Start: 2023-10-04 | End: 2024-01-03 | Stop reason: SDUPTHER

## 2023-10-04 RX ORDER — TRAMADOL HYDROCHLORIDE 50 MG/1
50-100 TABLET ORAL EVERY 12 HOURS PRN
Qty: 60 TABLET | Refills: 0 | Status: CANCELLED | OUTPATIENT
Start: 2023-10-04 | End: 2023-11-03

## 2023-10-04 RX ORDER — DUTASTERIDE 0.5 MG/1
0.5 CAPSULE, LIQUID FILLED ORAL DAILY
Qty: 90 CAPSULE | Refills: 0 | Status: SHIPPED | OUTPATIENT
Start: 2023-10-04 | End: 2024-01-03 | Stop reason: SDUPTHER

## 2023-10-04 RX ORDER — DUTASTERIDE 0.5 MG/1
0.5 CAPSULE, LIQUID FILLED ORAL DAILY
Qty: 90 CAPSULE | Refills: 0 | Status: CANCELLED | OUTPATIENT
Start: 2023-10-04 | End: 2024-01-02

## 2023-10-04 RX ORDER — OXYBUTYNIN CHLORIDE 5 MG/1
5 TABLET ORAL 2 TIMES DAILY
Qty: 180 TABLET | Refills: 0 | Status: SHIPPED | OUTPATIENT
Start: 2023-10-04 | End: 2024-01-03 | Stop reason: SDUPTHER

## 2023-10-04 ASSESSMENT — PATIENT HEALTH QUESTIONNAIRE - PHQ9
1. LITTLE INTEREST OR PLEASURE IN DOING THINGS: NOT AT ALL
SUM OF ALL RESPONSES TO PHQ9 QUESTIONS 1 AND 2: 0
2. FEELING DOWN, DEPRESSED OR HOPELESS: NOT AT ALL

## 2023-10-04 NOTE — PATIENT INSTRUCTIONS
Take the vitamin D on a daily basis as we discussed as it is still too low.    Follow up in 3 months.    It was a pleasure to see you today. Thank you for choosing us for your health care needs.    If you have lab or other testing completed and have not been informed of results within one week, please call the office for your results.    If you haven't done so, consider signing up for [facility], the [facility] personal health record. Ask the staff how you can get started.

## 2023-10-04 NOTE — PROGRESS NOTES
Subjective   Patient ID: Ivan Johnson is a 63 y.o. male who presents for Follow-up.    HPI     Patient states he has been having trouble receiving his CPAP from [facility].     Smoking 3-4 cigarettes per day.      Patient has hyperlipidemia.  Patient tries to limit the amount of fatty foods and high cholesterol foods that he consumes  His hyperlipidemia is currently treated with dietary/ lifestyle changes.      He has GERD.  He was treated with Esomeprazole.   He denies any breakthrough reflux symptoms.      Patient has obstructive sleep apnea.  Patient utilizes a CPAP machine on a nightly basis.  He is tolerating the machine well and does feel well rested in the mornings.     He has periodic leg movement disorder.  Symptoms were stable when he was on Requip.     He has chronic lower back pain and hip pain.  This has been effectively treated with Tramadol as needed.  NSAIDs have not provided adequate relief in the past.     He has a history of BPH/urinary frequency/urinary retention.  Symptoms have been well-controlled on the current combination of oxybutynin and Flomax.      He has nonalcoholic fatty liver disease.  Pt underwent laboratory evaluation as well as an ultrasound of the liver.   Patient has not had an actual biopsy performed.      He has vitamin D deficiency.  Patient continues to take over-the-counter vitamin D supplementation of 1000 units daily.       OARRS:  Sushant Wiseman,  on 10/4/2023  6:48 PM  I have personally reviewed the OARRS report for Ivan Johnson. I have considered the risks of abuse, dependence, addiction and diversion and I believe that it is clinically appropriate for Ivan Johnson to be prescribed this medication    Is the patient prescribed a combination of a benzodiazepine and opioid?  No    Last Urine Drug Screen / ordered today: No  Recent Results (from the past 8760 hour(s))   OPIATE/OPIOID/BENZO PRESCRIPTION COMPLIANCE    Collection Time: 04/06/23 11:08 AM   Result Value Ref  Range    DRUG SCREEN COMMENT URINE SEE BELOW     Creatine, Urine 143.2 mg/dL    Amphetamine Screen, Urine PRESUMPTIVE NEGATIVE NEGATIVE    Barbiturate Screen, Urine PRESUMPTIVE NEGATIVE NEGATIVE    Cannabinoid Screen, Urine PRESUMPTIVE NEGATIVE NEGATIVE    Cocaine Screen, Urine PRESUMPTIVE NEGATIVE NEGATIVE    PCP Screen, Urine PRESUMPTIVE NEGATIVE NEGATIVE    7-Aminoclonazepam <25 Cutoff <25 ng/mL    Alpha-Hydroxyalprazolam <25 Cutoff <25 ng/mL    Alpha-Hydroxymidazolam <25 Cutoff <25 ng/mL    Alprazolam <25 Cutoff <25 ng/mL    Chlordiazepoxide <25 Cutoff <25 ng/mL    Clonazepam <25 Cutoff <25 ng/mL    Diazepam <25 Cutoff <25 ng/mL    Lorazepam <25 Cutoff <25 ng/mL    Midazolam <25 Cutoff <25 ng/mL    Nordiazepam <25 Cutoff <25 ng/mL    Oxazepam <25 Cutoff <25 ng/mL    Temazepam <25 Cutoff <25 ng/mL    Zolpidem <25 Cutoff <25 ng/mL    Zolpidem Metabolite (ZCA) <25 Cutoff <25 ng/mL    6-Acetylmorphine <25 Cutoff <25 ng/mL    Codeine <50 Cutoff <50 ng/mL    Hydrocodone <25 Cutoff <25 ng/mL    Hydromorphone <25 Cutoff <25 ng/mL    Morphine Urine <50 Cutoff <50 ng/mL    Norhydrocodone <25 Cutoff <25 ng/mL    Noroxycodone <25 Cutoff <25 ng/mL    Oxycodone <25 Cutoff <25 ng/mL    Oxymorphone <25 Cutoff <25 ng/mL    Tramadol 830 (A) Cutoff <50 ng/mL    O-Desmethyltramadol >1000 (A) Cutoff <50 ng/mL    Fentanyl <2.5 Cutoff<2.5 ng/mL    Norfentanyl <2.5 Cutoff<2.5 ng/mL    METHADONE CONFIRMATION,URINE <25 Cutoff <25 ng/mL    EDDP <25 Cutoff <25 ng/mL     Results are as expected.         Controlled Substance Agreement:  Date of the Last Agreement: 4/6/2023  Reviewed Controlled Substance Agreement including but not limited to the benefits, risks, and alternatives to treatment with a Controlled Substance medication(s).    Opioids:  What is the patient's goal of therapy? Improve chronic pain  Is this being achieved with current treatment? Yes    I have calculated the patient's Morphine Dose Equivalent (MED):   I have considered  referral to Pain Management and/or a specialist, and do not feel it is necessary at this time.    I feel that it is clinically indicated to continue this current medication regimen after consideration of alternative therapies, and other non-opioid treatment.    Opioid Risk Screening:  Family History of Substance Abuse  Alcohol: 0 (4/5/2023  6:00 PM)  Illegal Drugs: 0 (4/5/2023  6:00 PM)  Prescription Drugs: 0 (4/5/2023  6:00 PM)    Personal History of Substance Abuse  Alcohol: 0 (4/5/2023  6:00 PM)  Illegal Drugs: 0 (4/5/2023  6:00 PM)  Prescription Drugs: 0 (4/5/2023  6:00 PM)    Patient Age (16-45)  Age (16-45): 0 (4/5/2023  6:00 PM)    History of Preadolescent Sexual Abuse  History of Preadolescent Sexual Abuse: .0 (4/5/2023  6:00 PM)    Psychological Disease  Attention Deficit Disorder, Obsessive Compulsive Disorder, Bipolar, Schizophrenia: 0 (4/5/2023  6:00 PM)  Depression: 0 (4/5/2023  6:00 PM)    Total Score  Total Score: 0 (4/5/2023  6:00 PM)    Total Score Risk Category  TOTAL SCORE CATEGORY: Low Risk (0-3) (4/5/2023  6:00 PM)        Pain Assessment:  Analgesia  What was your pain level on average during the past week?: 4  What was your pain level at its worst during the past week?: 7  What percentage of your pain has been relieved during the past week?: 40 %  Is the amount of pain relief you are now obtaining from your current pain relievers enough to make a real difference in your life?: Y  Query to Clinician: Is the patient's pain relief clinically significant?: Yes    Activities of Daily Living  Physical Functioning: Same  Family Relationships: Same  Social Relationships: Same  Mood: Same  Sleep Patterns: Same  Overall Functioning: Same    Adverse Events  Is patient experiencing any side effects from current pain relievers?: N  Patient's Overall Severity of Side Effects: None      Assessment  Is your overall impression that this patient is benefiting from opioid therapy?: Yes  Specific Analgesic Plan:  "Continue present regimen          Review of Systems  Constitutional: Patient denies any fever, chills, loss of appetite, or unexplained weight loss.  Cardiovascular: Patient denies any chest pain, shortness of breath with exertion, tachycardia, palpitations, orthopnea, or paroxysmal nocturnal dyspnea.  Respiratory: Patient denies any cough, shortness breath, or wheezing.  Gastrointestinal: Patient denies any nausea, vomiting, diarrhea, constipation, melena, hematochezia, or reflux symptoms.  Skin: Denies any rashes or skin lesions.   Neurology: Patient denies any new motor or sensory losses.  Denies any numbness, tingling, weakness, and incoordination of the extremities.  Patient also denies any tremor, seizures, or gait instability.  Endocrinology: Denies any polyuria, polydipsia, polyphagia, or heat/cold intolerance.    Objective   /72   Pulse 75   Temp 37.1 °C (98.7 °F)   Ht 1.854 m (6' 1\")   Wt 135 kg (298 lb 3.2 oz)   SpO2 96%   BMI 39.34 kg/m²     Physical Exam  General Appearance: Alert and cooperative, in no acute distress, well-developed/well-nourished male.  Neck: Supple and without adenopathy or rigidity.  There is no JVD at 90° and no carotid bruits are noted.  There is no thyromegaly, thyroid tenderness, or palpable thyroid nodules.  Heart: Regular rate and rhythm without murmur or ectopy.  Respiratory: Lungs are clear to auscultation bilaterally with good air exchange.  Good respiratory effort and no accessory muscle use.  Skin: Good turgor, moist, warm and without rashes or lesions.  Neurological exam: Alert and oriented ×3, no tremor, normal gait.  Extremities: No clubbing, cyanosis, or edema    Assessment/Plan     Chronic back pain: Patient will continue with tramadol for pain as needed.  NSAIDs have been ineffective for his pain in the past.   His pain seems to be worsening.  10/4/2023:  We will refer him to pain mgmt.     Hyperlipidemia: Dietary changes, exercise, and weight loss were " encouraged.  We will continue to monitor.     DM Type 2: Stable based on symptoms.  Last A1c was:  Lab Results   Component Value Date    HGBA1C 6.5 (A) 09/15/2023   He is not experiencing any polyuria, polydipsia, polyphagia.  Dietary changes, exercise, and maintenance of a healthy weight were discussed at length.  We discussed the need for more aggressive treatment if he is unable to reduce his A1c.     Vitamin D deficiency: Stable based on last vitamin D labs.  Patient to continue vitamin D supplementation.     NAFLD: He was encouraged to follow a low-fat diet and maintain a healthy weight.     Periodic leg movement disorder: Stable on the current treatment plan.    Patient to continue Requip at current dose.     BPH / urinary retention: Stable based on symptoms.  Patient to continue medications.    Sleep apnea: Stable.   We will continue with his CPAP therapy.   He continues to benefit from the CPAP therapy.  7/5/2023: CPAP Titration test ordered today.     Current smoker:   Pt has been encouraged to work on smoking cessation and available smoking cessation aids were discussed.  The importance of smoking cessation was discussed.   Patient understands that continued smoking will increase the risks of lung disease, vascular disease, and multiple malignancies.      GERD: Stable with the current medication and appropriate dietary changes.  Patient to continue Esomeprazole once daily.           PSA DUE 9/16/2024      Orders Placed This Encounter   Procedures    Referral to Pain Medicine     Requested Prescriptions     Signed Prescriptions Disp Refills    dutasteride (Avodart) 0.5 mg capsule 90 capsule 0     Sig: Take 1 capsule (0.5 mg) by mouth once daily.    esomeprazole (NexIUM) 40 mg DR capsule 90 capsule 0     Sig: Take 1 capsule (40 mg) by mouth once daily.    oxybutynin (Ditropan) 5 mg tablet 180 tablet 0     Sig: Take 1 tablet (5 mg) by mouth 2 times a day.    rOPINIRole (Requip) 0.5 mg tablet 90 tablet 0      Sig: Take 1 tablet (0.5 mg) by mouth once daily at bedtime.    tamsulosin (Flomax) 0.4 mg 24 hr capsule 90 capsule 0     Sig: Take 1 capsule (0.4 mg) by mouth once daily.    traMADol (Ultram) 50 mg tablet 60 tablet 0     Sig: Take 1-2 tablets ( mg) by mouth every 12 hours if needed for severe pain (7 - 10).

## 2023-10-16 ENCOUNTER — TELEPHONE (OUTPATIENT)
Dept: PRIMARY CARE | Facility: CLINIC | Age: 63
End: 2023-10-16
Payer: COMMERCIAL

## 2023-10-16 NOTE — TELEPHONE ENCOUNTER
Pt called and stated that [facility] is requesting for a CPAP diagnostic baseline. Phone number is [phone], please advise

## 2023-10-17 NOTE — TELEPHONE ENCOUNTER
Called [facility] and they had stated they do not have the sleep study for the patient at all in their system, please advise

## 2023-10-17 NOTE — TELEPHONE ENCOUNTER
Advise [facility]:    Patient has been on CPAP for over 7 years.  His original sleep study was done at a practice that no longer exists.    Pt has had several titration studies but should not have to repeat the diagnostic testing.

## 2023-10-27 ENCOUNTER — APPOINTMENT (OUTPATIENT)
Dept: PAIN MEDICINE | Facility: CLINIC | Age: 63
End: 2023-10-27
Payer: COMMERCIAL

## 2023-11-01 DIAGNOSIS — M54.50 CHRONIC BILATERAL LOW BACK PAIN, UNSPECIFIED WHETHER SCIATICA PRESENT: ICD-10-CM

## 2023-11-01 DIAGNOSIS — G89.29 CHRONIC BILATERAL LOW BACK PAIN, UNSPECIFIED WHETHER SCIATICA PRESENT: ICD-10-CM

## 2023-11-02 RX ORDER — TRAMADOL HYDROCHLORIDE 50 MG/1
50-100 TABLET ORAL EVERY 12 HOURS PRN
Qty: 60 TABLET | Refills: 0 | Status: SHIPPED | OUTPATIENT
Start: 2023-11-02 | End: 2023-11-17

## 2023-12-04 DIAGNOSIS — M54.50 CHRONIC BILATERAL LOW BACK PAIN WITHOUT SCIATICA: Primary | ICD-10-CM

## 2023-12-04 DIAGNOSIS — G89.29 CHRONIC BILATERAL LOW BACK PAIN WITHOUT SCIATICA: Primary | ICD-10-CM

## 2023-12-04 PROBLEM — E66.01 SEVERE OBESITY (MULTI): Status: ACTIVE | Noted: 2023-12-04

## 2023-12-04 PROBLEM — T14.8XXA SPRAIN OF JOINT CAPSULE: Status: ACTIVE | Noted: 2023-12-04

## 2023-12-04 PROBLEM — E11.9 TYPE 2 DIABETES MELLITUS WITHOUT COMPLICATION (MULTI): Status: ACTIVE | Noted: 2023-09-15

## 2023-12-04 PROBLEM — M76.829 TIBIALIS POSTERIOR TENDINITIS: Status: ACTIVE | Noted: 2023-12-04

## 2023-12-04 PROBLEM — J01.90 ACUTE SINUSITIS: Status: ACTIVE | Noted: 2023-12-04

## 2023-12-04 PROBLEM — M79.606 PAIN OF LOWER EXTREMITY: Status: ACTIVE | Noted: 2023-12-04

## 2023-12-04 PROBLEM — S43.409A SPRAIN OF SHOULDER: Status: ACTIVE | Noted: 2023-12-04

## 2023-12-04 PROBLEM — R35.0 INCREASED FREQUENCY OF URINATION: Status: ACTIVE | Noted: 2023-03-18

## 2023-12-04 RX ORDER — TRAMADOL HYDROCHLORIDE 50 MG/1
50 TABLET ORAL
COMMUNITY
End: 2023-12-04 | Stop reason: SDUPTHER

## 2023-12-06 RX ORDER — TRAMADOL HYDROCHLORIDE 50 MG/1
50-100 TABLET ORAL EVERY 12 HOURS PRN
Qty: 60 TABLET | Refills: 0 | Status: SHIPPED | OUTPATIENT
Start: 2023-12-06 | End: 2024-01-03 | Stop reason: SDUPTHER

## 2023-12-18 ENCOUNTER — TELEPHONE (OUTPATIENT)
Dept: PRIMARY CARE | Facility: CLINIC | Age: 63
End: 2023-12-18
Payer: COMMERCIAL

## 2023-12-19 NOTE — TELEPHONE ENCOUNTER
[facility] is still telling pt he needs to get either a new one or the old diagnostic sleep study       Following reply was from Dr. Wiseman in separate note:  Advise pt that the CPAP supplier is going to require him to have a repeat sleep study since his old test results are not available.  If he wants to proceed, we can order the testing for him.    LMTCB

## 2023-12-20 NOTE — TELEPHONE ENCOUNTER
Pt aware, spoke to Rafael and she had advised that she was going to see if she could find a sleep study, but had only found a portion of it that was done 10 years ago, but per [facility] they stated that patient needs a sleep study every 10 years. Pt said he would contact his insurance company to make sure its covered and will contact us and let us know so that Dr. Wiseman can put in the order.

## 2024-01-03 ENCOUNTER — OFFICE VISIT (OUTPATIENT)
Dept: PRIMARY CARE | Facility: CLINIC | Age: 64
End: 2024-01-03
Payer: COMMERCIAL

## 2024-01-03 VITALS
OXYGEN SATURATION: 92 % | TEMPERATURE: 97.9 F | HEART RATE: 74 BPM | DIASTOLIC BLOOD PRESSURE: 86 MMHG | SYSTOLIC BLOOD PRESSURE: 138 MMHG | BODY MASS INDEX: 40.39 KG/M2 | HEIGHT: 73 IN | WEIGHT: 304.8 LBS

## 2024-01-03 DIAGNOSIS — E55.9 VITAMIN D DEFICIENCY: ICD-10-CM

## 2024-01-03 DIAGNOSIS — K04.7 DENTAL INFECTION: ICD-10-CM

## 2024-01-03 DIAGNOSIS — M54.50 CHRONIC BILATERAL LOW BACK PAIN WITHOUT SCIATICA: Primary | ICD-10-CM

## 2024-01-03 DIAGNOSIS — K76.0 NAFLD (NONALCOHOLIC FATTY LIVER DISEASE): ICD-10-CM

## 2024-01-03 DIAGNOSIS — G89.29 CHRONIC BILATERAL LOW BACK PAIN WITHOUT SCIATICA: Primary | ICD-10-CM

## 2024-01-03 DIAGNOSIS — K21.9 GASTROESOPHAGEAL REFLUX DISEASE WITHOUT ESOPHAGITIS: ICD-10-CM

## 2024-01-03 DIAGNOSIS — N40.1 BENIGN PROSTATIC HYPERPLASIA WITH URINARY RETENTION: ICD-10-CM

## 2024-01-03 DIAGNOSIS — F17.200 CURRENT SMOKER: ICD-10-CM

## 2024-01-03 DIAGNOSIS — E11.9 TYPE 2 DIABETES MELLITUS WITHOUT COMPLICATION, WITHOUT LONG-TERM CURRENT USE OF INSULIN (MULTI): ICD-10-CM

## 2024-01-03 DIAGNOSIS — R33.8 BENIGN PROSTATIC HYPERPLASIA WITH URINARY RETENTION: ICD-10-CM

## 2024-01-03 DIAGNOSIS — E78.2 MIXED HYPERLIPIDEMIA: ICD-10-CM

## 2024-01-03 DIAGNOSIS — R33.9 URINARY RETENTION: ICD-10-CM

## 2024-01-03 DIAGNOSIS — G47.61 PERIODIC LIMB MOVEMENT: ICD-10-CM

## 2024-01-03 DIAGNOSIS — G47.33 OSA (OBSTRUCTIVE SLEEP APNEA): ICD-10-CM

## 2024-01-03 PROCEDURE — 99214 OFFICE O/P EST MOD 30 MIN: CPT | Performed by: FAMILY MEDICINE

## 2024-01-03 PROCEDURE — 3079F DIAST BP 80-89 MM HG: CPT | Performed by: FAMILY MEDICINE

## 2024-01-03 PROCEDURE — 3075F SYST BP GE 130 - 139MM HG: CPT | Performed by: FAMILY MEDICINE

## 2024-01-03 RX ORDER — DUTASTERIDE 0.5 MG/1
0.5 CAPSULE, LIQUID FILLED ORAL DAILY
Qty: 90 CAPSULE | Refills: 0 | Status: SHIPPED | OUTPATIENT
Start: 2024-01-03 | End: 2024-04-03 | Stop reason: SDUPTHER

## 2024-01-03 RX ORDER — OXYBUTYNIN CHLORIDE 5 MG/1
5 TABLET ORAL 2 TIMES DAILY
Qty: 180 TABLET | Refills: 0 | Status: SHIPPED | OUTPATIENT
Start: 2024-01-03 | End: 2024-04-03 | Stop reason: SDUPTHER

## 2024-01-03 RX ORDER — CLINDAMYCIN HYDROCHLORIDE 300 MG/1
300 CAPSULE ORAL 3 TIMES DAILY
Qty: 30 CAPSULE | Refills: 0 | Status: SHIPPED | OUTPATIENT
Start: 2024-01-03 | End: 2024-01-13

## 2024-01-03 RX ORDER — ROPINIROLE 0.5 MG/1
0.5 TABLET, FILM COATED ORAL NIGHTLY
Qty: 90 TABLET | Refills: 0 | Status: SHIPPED | OUTPATIENT
Start: 2024-01-03 | End: 2024-04-03 | Stop reason: SDUPTHER

## 2024-01-03 RX ORDER — TRAMADOL HYDROCHLORIDE 50 MG/1
50-100 TABLET ORAL EVERY 12 HOURS PRN
Qty: 60 TABLET | Refills: 0 | Status: SHIPPED | OUTPATIENT
Start: 2024-01-03 | End: 2024-02-05 | Stop reason: SDUPTHER

## 2024-01-03 RX ORDER — TAMSULOSIN HYDROCHLORIDE 0.4 MG/1
0.4 CAPSULE ORAL
Qty: 90 CAPSULE | Refills: 0 | Status: SHIPPED | OUTPATIENT
Start: 2024-01-03 | End: 2024-04-03 | Stop reason: SDUPTHER

## 2024-01-03 RX ORDER — ESOMEPRAZOLE MAGNESIUM 40 MG/1
40 CAPSULE, DELAYED RELEASE ORAL DAILY
Qty: 90 CAPSULE | Refills: 0 | Status: SHIPPED | OUTPATIENT
Start: 2024-01-03 | End: 2024-04-03 | Stop reason: SDUPTHER

## 2024-01-03 ASSESSMENT — PATIENT HEALTH QUESTIONNAIRE - PHQ9
1. LITTLE INTEREST OR PLEASURE IN DOING THINGS: NOT AT ALL
2. FEELING DOWN, DEPRESSED OR HOPELESS: NOT AT ALL
SUM OF ALL RESPONSES TO PHQ9 QUESTIONS 1 AND 2: 0

## 2024-01-03 NOTE — PROGRESS NOTES
Subjective   Patient ID: Ivan Johnson is a 63 y.o. male who presents for Follow-up.    HPI     Patient states he has a toothache that had started to get painful yesterday and reports some swelling.   His dentist is out of the office for the holidays.  Pain is upper right jaw.  Area is becoming tender to touch on the face.    Would like to discuss CPAP machine.  Needs to have diagnostic test as his last one was years ago (results not available) and supply company requiring one to be done.    No other concerns at this time.     Patient has hyperlipidemia.  Patient tries to limit the amount of fatty foods and high cholesterol foods that he consumes  His hyperlipidemia is currently treated with dietary/ lifestyle changes.      He has GERD.  He was treated with Esomeprazole.   He denies any breakthrough reflux symptoms.      Patient has obstructive sleep apnea.  Patient utilizes a CPAP machine on a nightly basis.  He is tolerating the machine well and does feel well rested in the mornings.     He has periodic leg movement disorder.  Symptoms were stable when he was on Requip.     He has chronic lower back pain and hip pain.  This has been effectively treated with Tramadol as needed.  NSAIDs have not provided adequate relief in the past.     He has a history of BPH/urinary frequency/urinary retention.  Symptoms have been well-controlled on the current combination of oxybutynin and Flomax.      He has nonalcoholic fatty liver disease.  Pt underwent laboratory evaluation as well as an ultrasound of the liver.   Patient has not had an actual biopsy performed.      He has vitamin D deficiency.  Patient continues to take over-the-counter vitamin D supplementation of 1000 units daily.       OARRS:  Sushant Wiseman DO on 1/3/2024  6:27 PM  I have personally reviewed the OARRS report for Ivan Johnson. I have considered the risks of abuse, dependence, addiction and diversion and I believe that it is clinically appropriate for  Ivan Johnson to be prescribed this medication    Is the patient prescribed a combination of a benzodiazepine and opioid?  No    Last Urine Drug Screen / ordered today: No  Recent Results (from the past 8760 hour(s))   OPIATE/OPIOID/BENZO PRESCRIPTION COMPLIANCE    Collection Time: 04/06/23 11:08 AM   Result Value Ref Range    DRUG SCREEN COMMENT URINE SEE BELOW     Creatine, Urine 143.2 mg/dL    Amphetamine Screen, Urine PRESUMPTIVE NEGATIVE NEGATIVE    Barbiturate Screen, Urine PRESUMPTIVE NEGATIVE NEGATIVE    Cannabinoid Screen, Urine PRESUMPTIVE NEGATIVE NEGATIVE    Cocaine Screen, Urine PRESUMPTIVE NEGATIVE NEGATIVE    PCP Screen, Urine PRESUMPTIVE NEGATIVE NEGATIVE    7-Aminoclonazepam <25 Cutoff <25 ng/mL    Alpha-Hydroxyalprazolam <25 Cutoff <25 ng/mL    Alpha-Hydroxymidazolam <25 Cutoff <25 ng/mL    Alprazolam <25 Cutoff <25 ng/mL    Chlordiazepoxide <25 Cutoff <25 ng/mL    Clonazepam <25 Cutoff <25 ng/mL    Diazepam <25 Cutoff <25 ng/mL    Lorazepam <25 Cutoff <25 ng/mL    Midazolam <25 Cutoff <25 ng/mL    Nordiazepam <25 Cutoff <25 ng/mL    Oxazepam <25 Cutoff <25 ng/mL    Temazepam <25 Cutoff <25 ng/mL    Zolpidem <25 Cutoff <25 ng/mL    Zolpidem Metabolite (ZCA) <25 Cutoff <25 ng/mL    6-Acetylmorphine <25 Cutoff <25 ng/mL    Codeine <50 Cutoff <50 ng/mL    Hydrocodone <25 Cutoff <25 ng/mL    Hydromorphone <25 Cutoff <25 ng/mL    Morphine Urine <50 Cutoff <50 ng/mL    Norhydrocodone <25 Cutoff <25 ng/mL    Noroxycodone <25 Cutoff <25 ng/mL    Oxycodone <25 Cutoff <25 ng/mL    Oxymorphone <25 Cutoff <25 ng/mL    Tramadol 830 (A) Cutoff <50 ng/mL    O-Desmethyltramadol >1000 (A) Cutoff <50 ng/mL    Fentanyl <2.5 Cutoff<2.5 ng/mL    Norfentanyl <2.5 Cutoff<2.5 ng/mL    METHADONE CONFIRMATION,URINE <25 Cutoff <25 ng/mL    EDDP <25 Cutoff <25 ng/mL     Results are as expected.         Controlled Substance Agreement:  Date of the Last Agreement: 4/5/2023  Reviewed Controlled Substance Agreement including but  not limited to the benefits, risks, and alternatives to treatment with a Controlled Substance medication(s).    Opioids:  What is the patient's goal of therapy? Reduce pain as needed  Is this being achieved with current treatment? yes    I have calculated the patient's Morphine Dose Equivalent (MED):   I have considered referral to Pain Management and/or a specialist, and do not feel it is necessary at this time.    I feel that it is clinically indicated to continue this current medication regimen after consideration of alternative therapies, and other non-opioid treatment.    Opioid Risk Screening:  Family History of Substance Abuse  Alcohol: 0 (4/5/2023  6:00 PM)  Illegal Drugs: 0 (4/5/2023  6:00 PM)  Prescription Drugs: 0 (4/5/2023  6:00 PM)    Personal History of Substance Abuse  Alcohol: 0 (4/5/2023  6:00 PM)  Illegal Drugs: 0 (4/5/2023  6:00 PM)  Prescription Drugs: 0 (4/5/2023  6:00 PM)    Patient Age (16-45)  Age (16-45): 0 (4/5/2023  6:00 PM)    History of Preadolescent Sexual Abuse  History of Preadolescent Sexual Abuse: .0 (4/5/2023  6:00 PM)    Psychological Disease  Attention Deficit Disorder, Obsessive Compulsive Disorder, Bipolar, Schizophrenia: 0 (4/5/2023  6:00 PM)  Depression: 0 (4/5/2023  6:00 PM)    Total Score  Total Score: 0 (4/5/2023  6:00 PM)    Total Score Risk Category  TOTAL SCORE CATEGORY: Low Risk (0-3) (4/5/2023  6:00 PM)        Pain Assessment:  Analgesia  What was your pain level on average during the past week?: 3  What was your pain level at its worst during the past week?: 7  What percentage of your pain has been relieved during the past week?: 45 %  Is the amount of pain relief you are now obtaining from your current pain relievers enough to make a real difference in your life?: Y  Query to Clinician: Is the patient's pain relief clinically significant?: Yes    Activities of Daily Living  Physical Functioning: Same  Family Relationships: Same  Social Relationships: Same  Mood:  "Same  Sleep Patterns: Same  Overall Functioning: Same    Adverse Events  Is patient experiencing any side effects from current pain relievers?: N  Patient's Overall Severity of Side Effects: None      Assessment  Is your overall impression that this patient is benefiting from opioid therapy?: Yes  Specific Analgesic Plan: Continue present regimen            Review of Systems  Constitutional: Patient denies any fever, chills, loss of appetite, or unexplained weight loss.  Cardiovascular: Patient denies any chest pain, shortness of breath with exertion, tachycardia, palpitations, orthopnea, or paroxysmal nocturnal dyspnea.  Respiratory: Patient denies any cough, shortness breath, or wheezing.  Skin: Denies any rashes or skin lesions.   Neurology: Patient denies any new motor or sensory losses.  Denies any numbness, tingling, weakness, and incoordination of the extremities.  Patient also denies any tremor, seizures, or gait instability.  Endocrinology: Denies any polyuria, polydipsia, polyphagia, or heat/cold intolerance.    MS: Has some generalized joint pain that he relates to arthritis.  Has chronic low back pain at baseline.      Objective   /86   Pulse 74   Temp 36.6 °C (97.9 °F)   Ht 1.854 m (6' 1\")   Wt 138 kg (304 lb 12.8 oz)   SpO2 92%   BMI 40.21 kg/m²     Physical Exam  General Appearance: Alert and cooperative, in no acute distress, well-developed/well-nourished.  Neck: Supple and without adenopathy or rigidity.  There is no JVD at 90° and no carotid bruits are noted.  There is no thyromegaly, thyroid tenderness, or palpable thyroid nodules.  Heart: Regular rate and rhythm without murmur or ectopy.  Respiratory: Lungs are clear to auscultation bilaterally with good air exchange.  Good respiratory effort and no accessory muscle use.  Skin: Good turgor, moist, warm and without rashes or lesions.  Neurological exam: Alert and oriented ×3, no tremor, normal gait.  Extremities: No clubbing, cyanosis, " or edema    Head:  Mild swelling just below zygomatic bone on right.  Non-fluctuant.    Assessment/Plan     Chronic back pain: Patient will continue with tramadol for pain as needed.  NSAIDs have been ineffective for his pain in the past.   His pain seems to be worsening.  10/4/2023:  We referred him to pain mgmt.     Hyperlipidemia: Dietary changes, exercise, and weight loss were encouraged.  We will continue to monitor.     DM Type 2: Stable based on symptoms.  Last A1c was:  Lab Results  Component Value Date    HGBA1C 6.5 (A) 09/15/2023  He is not experiencing any polyuria, polydipsia, polyphagia.  Dietary changes, exercise, and maintenance of a healthy weight were discussed at length.  We discussed the need for more aggressive treatment if he is unable to reduce his A1c.      Vitamin D deficiency: Stable based on last vitamin D labs.  Patient to continue vitamin D supplementation.     NAFLD: He was encouraged to follow a low-fat diet and maintain a healthy weight.     Periodic leg movement disorder: Stable on the current treatment plan.    Patient to continue Requip at current dose.     BPH / urinary retention: Stable based on symptoms.  Patient to continue medications.     Sleep apnea: Stable.   We will continue with his CPAP therapy.   He continues to benefit from the CPAP therapy.  7/5/2023: CPAP Titration test ordered today.  1/3/2024:  His insurance is requiring a new diagnostic sleep study be ordered in order to cover his CPAP treatment.  His last diagnostic results are not available as that practice no longer exists and records are not available that far back.     Current smoker:   Pt has been encouraged to work on smoking cessation and available smoking cessation aids were discussed.  The importance of smoking cessation was discussed.   Patient understands that continued smoking will increase the risks of lung disease, vascular disease, and multiple malignancies.      GERD: Stable with the current  medication and appropriate dietary changes.  Patient to continue Esomeprazole once daily.       Dental infection: We will start him on clindamycin till he can be seen by his dentist.     PSA DUE 9/16/2024       Orders Placed This Encounter   Procedures    Hemoglobin A1C    Basic Metabolic Panel    Vitamin D 25-Hydroxy,Total (for eval of Vitamin D levels)     Requested Prescriptions     Signed Prescriptions Disp Refills    dutasteride (Avodart) 0.5 mg capsule 90 capsule 0     Sig: Take 1 capsule (0.5 mg) by mouth once daily.    oxybutynin (Ditropan) 5 mg tablet 180 tablet 0     Sig: Take 1 tablet (5 mg) by mouth 2 times a day.    tamsulosin (Flomax) 0.4 mg 24 hr capsule 90 capsule 0     Sig: Take 1 capsule (0.4 mg) by mouth once daily.    traMADol (Ultram) 50 mg tablet 60 tablet 0     Sig: Take 1-2 tablets ( mg) by mouth every 12 hours if needed for severe pain (7 - 10).    esomeprazole (NexIUM) 40 mg DR capsule 90 capsule 0     Sig: Take 1 capsule (40 mg) by mouth once daily.    rOPINIRole (Requip) 0.5 mg tablet 90 tablet 0     Sig: Take 1 tablet (0.5 mg) by mouth once daily at bedtime.    clindamycin (Cleocin) 300 mg capsule 30 capsule 0     Sig: Take 1 capsule (300 mg) by mouth 3 times a day for 10 days.

## 2024-01-09 DIAGNOSIS — G47.33 OSA (OBSTRUCTIVE SLEEP APNEA): Primary | ICD-10-CM

## 2024-01-12 ENCOUNTER — OFFICE VISIT (OUTPATIENT)
Dept: PAIN MEDICINE | Facility: CLINIC | Age: 64
End: 2024-01-12
Payer: COMMERCIAL

## 2024-01-12 VITALS
RESPIRATION RATE: 18 BRPM | DIASTOLIC BLOOD PRESSURE: 85 MMHG | TEMPERATURE: 97.7 F | SYSTOLIC BLOOD PRESSURE: 160 MMHG | HEART RATE: 77 BPM

## 2024-01-12 DIAGNOSIS — M54.50 CHRONIC BILATERAL LOW BACK PAIN, UNSPECIFIED WHETHER SCIATICA PRESENT: ICD-10-CM

## 2024-01-12 DIAGNOSIS — G89.29 CHRONIC BILATERAL LOW BACK PAIN, UNSPECIFIED WHETHER SCIATICA PRESENT: ICD-10-CM

## 2024-01-12 PROCEDURE — 3079F DIAST BP 80-89 MM HG: CPT | Performed by: PAIN MEDICINE

## 2024-01-12 PROCEDURE — 99204 OFFICE O/P NEW MOD 45 MIN: CPT | Performed by: PAIN MEDICINE

## 2024-01-12 PROCEDURE — 3077F SYST BP >= 140 MM HG: CPT | Performed by: PAIN MEDICINE

## 2024-01-12 PROCEDURE — 99214 OFFICE O/P EST MOD 30 MIN: CPT | Performed by: PAIN MEDICINE

## 2024-01-12 RX ORDER — GABAPENTIN 300 MG/1
300 CAPSULE ORAL 2 TIMES DAILY
Qty: 60 CAPSULE | Refills: 11 | Status: SHIPPED | OUTPATIENT
Start: 2024-01-12

## 2024-01-12 RX ORDER — MELOXICAM 15 MG/1
15 TABLET ORAL DAILY
Qty: 30 TABLET | Refills: 11 | Status: SHIPPED | OUTPATIENT
Start: 2024-01-12 | End: 2025-01-11

## 2024-01-12 ASSESSMENT — COLUMBIA-SUICIDE SEVERITY RATING SCALE - C-SSRS
6. HAVE YOU EVER DONE ANYTHING, STARTED TO DO ANYTHING, OR PREPARED TO DO ANYTHING TO END YOUR LIFE?: NO
1. IN THE PAST MONTH, HAVE YOU WISHED YOU WERE DEAD OR WISHED YOU COULD GO TO SLEEP AND NOT WAKE UP?: NO
2. HAVE YOU ACTUALLY HAD ANY THOUGHTS OF KILLING YOURSELF?: NO

## 2024-01-12 ASSESSMENT — PAIN SCALES - GENERAL
PAINLEVEL_OUTOF10: 8
PAINLEVEL: 8

## 2024-01-12 ASSESSMENT — PAIN - FUNCTIONAL ASSESSMENT: PAIN_FUNCTIONAL_ASSESSMENT: 0-10

## 2024-01-12 NOTE — H&P
History Of Present Illness  Ivan Johnson is a 63 y.o. male presenting with generalized arthritic pain the patient is giving a history of having a bilateral hip replacement performed close to 20 years ago he continues to describe pain in the hips the shoulders the hands and the feet he had prior x-rays of the shoulder showing bilateral moderate degenerative changes at the acromioclavicular joint he was sent to physical therapy and continues to do the exercises and he has been on tramadol for multiple years.  Has been using Advil regularly or Excedrin Migraine he described them as beneficial for few hours.  Rating currently his pain at the level of 7 out of 10 describing his pain as a burning sensation as if electricity is running through the humming sensation patient was also operated on in the lower lumbar spine area at age 41 with a laminectomy at multiple levels.  Past Medical History  Past Medical History:   Diagnosis Date    Benign prostatic hyperplasia without lower urinary tract symptoms 01/04/2023    Benign prostate hyperplasia    Dorsalgia, unspecified 01/04/2023    Chronic back pain    Gastro-esophageal reflux disease without esophagitis 01/04/2023    GERD (gastroesophageal reflux disease)    Male erectile dysfunction, unspecified 08/31/2015    Erectile dysfunction    Periodic limb movement disorder 01/04/2023    Periodic limb movement    Personal history of other diseases of the musculoskeletal system and connective tissue 03/17/2016    History of osteonecrosis    Retention of urine, unspecified 01/04/2023    Urinary retention    Sleep apnea, unspecified 09/14/2020    Apnea, sleep       Surgical History  Past Surgical History:   Procedure Laterality Date    COLONOSCOPY  09/01/2015    Complete Colonoscopy    HERNIA REPAIR  08/31/2015    Inguinal Hernia Repair    OTHER SURGICAL HISTORY  08/31/2015    Orchiectomy Left    TOTAL HIP ARTHROPLASTY  09/01/2015    Hip Replacement        Social History  He reports  that he has been smoking cigarettes. He has never used smokeless tobacco. He reports that he does not drink alcohol and does not use drugs.    Family History  Family History   Problem Relation Name Age of Onset    Diabetes Mother      Colon cancer Father      Diabetes Mother's Brother          Allergies  Penicillins and Varenicline    Review of Systems   12 Systems have been reviewed as follows.   Constitutional: Fever, weight gain, weight loss, appetite change, night sweats, fatigue, chills.  Eyes : blurry, double vision, vision, loss, tearing, redness, pain, sensitivity to light, glaucoma.  Ears, nose, mouth, and throat: Hearing loss, ringing in the ears, ear pain, nasal congestion, nasal drainage, nosebleeds, mouth, throat, irritation tooth problem.  Cardiovascular :chest pain, pressure, heart tracing,palpaitations , sweating, leg swelling, high or low blood pressure  Pulmonary: Cough, yellow or green sputum, blood and sputum, shortness of breath, wheezing  Gastrointestinal: Nause, vomiting, diarrhea, constipation, pain, blood in stool, or vomitus, heartburn, difficulty swallowing  Genitourinary: incontinence, abnormal bleeding, abnormal discharge, urinary frequency, urinary hesitancy, pain, impotence sexual problem, infection, urinary retention  Musculoskeletal: Pain, stiffness, joint, redness or warmth, arthritis, back pain, weakness, muscle wasting, sprain or fracture  Neuro: Weight weakness, dizziness, change in voice, change in taste change in vision, change in hearing, loss, or change of sensation, trouble walking, balance problems coordination problems, shaking, speech problem  Endocrine , cold or heat intolerance, blood sugar problem, weight gain or loss missed periods hot flashes, sweats, change in body hair, change in libido, increased thirst, increased urination  Heme/lymph: Swelling, bleeding, problem anemia, bruising, enlarged lymph nodes  Allergic/immunologic: H. plus nasal drip, watery itchy eyes,  nasal drainage, immunosuppressed  The above, were reviewed and noted negative except as noted.    Physical Exam   Vital signs reviewed, documented in chart     General:  Appears well, does not look in any major distress  Alert    HEENT:  Head atraumatic  Eyes normal inspection  PERRL  Normal ENT inspection  No signs of dehydration    NECK:  Normal inspection  Range of motion within normal     RESPIRATORY:  No respiratory distress    CVS:  Heart rate and rhythm regular    ABDOMEN/GI  Soft  Non-tender  No distention  No organomegaly      BACK:  Normal inspection, flexion and extension limited and provocative of pain  no tenderness upon the palpation of the facet joint  Si joints none tender to palpations     EXTREMITIES:  Limited ROM of both shoulder and provocative of pain  Normal appearance  No Pedal edema  Power symmetrical , sensory examination preserved.    NEURO:  Alert and oriented X 3  CNS normal as tested without focal neurological deficit   Sensation normal  Motor ambulating and antalgic gait  reflexes diminished in the lower extremity    PSYCH:  Mood normal  Affect normal    SKIN:  Color normal  No rash  Warm  Dry  no sign of skin marking supportive of IV drug usage /abuse.    Last Recorded Vitals  Blood pressure 160/85, pulse 77, temperature 36.5 °C (97.7 °F), resp. rate 18.    Relevant Results           Assessment/Plan       63 years old with history and physical examination supportive of chronic back pain status post laminectomy generalized osteoarthritic pain status post bilateral hip replacement with the bilateral shoulder pain    Plan  Discussed with the patient the different modalities available for the treatment of his condition I recommended for him I will be obtaining x-ray of the lumbar spine area I will be starting the patient on Mobic 15 mg once per day the patient understand that this is a nonsteroidal anti-inflammatory and it would be a replacement for over-the-counter nonsteroidal  anti-inflammatory that he is currently taking such as the Excedrin and the Advil  I will be also starting the patient on gabapentin 300 mg at bedtime after a few days the patient will be increasing it to twice a day I will be following up with the patient within 3 months to reassess his improvement and continue the titration of the medication  On as needed basis I advised the patient that we could target certain joints with a steroid and local anesthetic injection such as giving him injection in the shoulder joint or in the hip joint or the knee joint on as-needed basis the patient was instructed to continue with the physical therapy exercises that he learned at home patient verbalized understanding and agreement with the plan      The above clinical summary has been dictated with voice recognition software. It has not been proofread for grammatical errors, typographical mistakes, or other semantic inconsistencies.    Thank you for visiting our office today. It was our pleasure to take part in your healthcare.     Please do not hesitate to contact the pain clinic after your visit with any questions or concerns at  M-F 8-4 pm       Christiana Pena M.D.  Medical Director , Division of Pain Medicine [facility]   of Anesthesiology and Pain Medicine  [facility]     [place] [address] [address] [place] [address]     Office: [phone]  Fax: [phone]              Christiana Pena MD

## 2024-01-26 ENCOUNTER — HOSPITAL ENCOUNTER (OUTPATIENT)
Dept: RADIOLOGY | Facility: CLINIC | Age: 64
Discharge: HOME | End: 2024-01-26
Payer: COMMERCIAL

## 2024-01-26 DIAGNOSIS — M54.50 CHRONIC BILATERAL LOW BACK PAIN, UNSPECIFIED WHETHER SCIATICA PRESENT: ICD-10-CM

## 2024-01-26 DIAGNOSIS — G89.29 CHRONIC BILATERAL LOW BACK PAIN, UNSPECIFIED WHETHER SCIATICA PRESENT: ICD-10-CM

## 2024-01-26 PROCEDURE — 72100 X-RAY EXAM L-S SPINE 2/3 VWS: CPT

## 2024-01-26 PROCEDURE — 72100 X-RAY EXAM L-S SPINE 2/3 VWS: CPT | Performed by: RADIOLOGY

## 2024-02-05 DIAGNOSIS — M54.50 CHRONIC BILATERAL LOW BACK PAIN WITHOUT SCIATICA: ICD-10-CM

## 2024-02-05 DIAGNOSIS — G89.29 CHRONIC BILATERAL LOW BACK PAIN WITHOUT SCIATICA: ICD-10-CM

## 2024-02-09 RX ORDER — TRAMADOL HYDROCHLORIDE 50 MG/1
50-100 TABLET ORAL EVERY 12 HOURS PRN
Qty: 60 TABLET | Refills: 0 | Status: SHIPPED | OUTPATIENT
Start: 2024-02-09 | End: 2024-03-05 | Stop reason: SDUPTHER

## 2024-02-27 ENCOUNTER — CLINICAL SUPPORT (OUTPATIENT)
Dept: SLEEP MEDICINE | Facility: HOSPITAL | Age: 64
End: 2024-02-27
Payer: COMMERCIAL

## 2024-02-27 VITALS — BODY MASS INDEX: 40.32 KG/M2 | WEIGHT: 304.24 LBS | HEIGHT: 73 IN

## 2024-02-27 DIAGNOSIS — G47.33 OSA (OBSTRUCTIVE SLEEP APNEA): ICD-10-CM

## 2024-02-27 PROCEDURE — 95811 POLYSOM 6/>YRS CPAP 4/> PARM: CPT | Performed by: INTERNAL MEDICINE

## 2024-02-27 ASSESSMENT — SLEEP AND FATIGUE QUESTIONNAIRES: ESS TOTAL SCORE: 0

## 2024-02-28 NOTE — PROGRESS NOTES
[facility] TECH NOTE:     Patient: Ivan Johnson   MRN//AGE: [id number]  1960  63 y.o.   Technologist: Devonte Jacobo   Room: 404   Service Date: 2024        Sleep Testing Location: [facility] [place]    Bryant: 0    TECHNOLOGIST SLEEP STUDY PROCEDURE NOTE:   This sleep study is being conducted according to the policies and procedures outlined by the AAS accreditation standards.  The sleep study procedure and processes involved during this appointment was explained to the patient/patient’s family, questions were answered. The patient/family verbalized understanding.      The patient is a 63 y.o. year old male scheduled for a Diagnostic PSG Split night with montage of:  Split . he arrived for his appointment.      The study that was ultimately completed was a Diagnostic PSG Split night with montage of:  Split .    The full study Was completed.  Patient questionnaires completed?: yes     Consents signed? yes    Initial Fall Risk Screening:     Ivan has not fallen in the last 6 months. his fall did not result in injury. Ivan does not have a fear of falling. He does not need assistance with sitting, standing, or walking. he does not need assistance walking in his home. he does not need assistance in an unfamiliar setting. The patient is not using an assistive device.     Brief Study observations: Patient arrived for his scheduled Split study. The patient stated he has been on CPAP of 13 cm H2O at home, using a nasal mask. His machine broke several months ago and he is here to re-qualify for a new machine. The hook-up was done in the room and the purpose of all sensors was explained. A normal sleep latency was seen. Continuous moderate snoring was heard, with snorting. Severe respiratory events were seen, so CPAP was applied once the split criteria was met. The patient wanted to use his own mask from home (ResMed Airfit N20 Large) but the leak was unacceptable so a different mask was applied (Respironics Nuance Pro Gel  small nasal Pillows). CPAP was initiated at 8 cm H2O at pt request, as he could not tolerate any lower. The pressure was increased throughout the study to eliminate events. A chin strap was eventually added, due to leak in REM. All sleep stages were seen. REM supine was captured. The patient was observed sleeping in supine, left side, and right side positions. NSR was observed throughout the study. The patient woke up around 4:15AM and requested to end the study.    Deviation to order/protocol and reason: N/A      If PAP, which was preferred mask/pressure/mode: Respironics Nuance Pro Gel Small Nasal Pillows with a chin strap, 16 cm H2O     Other:None    After the procedure, the patient/family was informed to ensure followup with ordering clinician for testing results.      Technologist: Devonte Jacobo

## 2024-03-05 DIAGNOSIS — G89.29 CHRONIC BILATERAL LOW BACK PAIN WITHOUT SCIATICA: ICD-10-CM

## 2024-03-05 DIAGNOSIS — M54.50 CHRONIC BILATERAL LOW BACK PAIN WITHOUT SCIATICA: ICD-10-CM

## 2024-03-05 DIAGNOSIS — G47.33 OSA (OBSTRUCTIVE SLEEP APNEA): Primary | ICD-10-CM

## 2024-03-05 NOTE — TELEPHONE ENCOUNTER
Pt is requesting a refill of his Tramadol and would like to know the result of his sleep study done last week.

## 2024-03-07 RX ORDER — TRAMADOL HYDROCHLORIDE 50 MG/1
50-100 TABLET ORAL EVERY 12 HOURS PRN
Qty: 60 TABLET | Refills: 0 | Status: SHIPPED | OUTPATIENT
Start: 2024-03-07 | End: 2024-04-03 | Stop reason: SDUPTHER

## 2024-03-07 NOTE — TELEPHONE ENCOUNTER
Advise pt that his sleep study did show severe sleep apnea.  We will be sending an order for the CPAP machine to medical services company.    FAX order and sleep study/titration to [facility].    RX refill was sent for him.

## 2024-03-08 ENCOUNTER — LAB (OUTPATIENT)
Dept: LAB | Facility: LAB | Age: 64
End: 2024-03-08
Payer: COMMERCIAL

## 2024-03-08 DIAGNOSIS — E11.9 TYPE 2 DIABETES MELLITUS WITHOUT COMPLICATION, WITHOUT LONG-TERM CURRENT USE OF INSULIN (MULTI): ICD-10-CM

## 2024-03-08 DIAGNOSIS — E78.2 MIXED HYPERLIPIDEMIA: ICD-10-CM

## 2024-03-08 DIAGNOSIS — E55.9 VITAMIN D DEFICIENCY: ICD-10-CM

## 2024-03-08 LAB
25(OH)D3 SERPL-MCNC: 26 NG/ML (ref 30–100)
ANION GAP SERPL CALC-SCNC: 13 MMOL/L (ref 10–20)
BUN SERPL-MCNC: 17 MG/DL (ref 6–23)
CALCIUM SERPL-MCNC: 9.3 MG/DL (ref 8.6–10.3)
CHLORIDE SERPL-SCNC: 103 MMOL/L (ref 98–107)
CO2 SERPL-SCNC: 27 MMOL/L (ref 21–32)
CREAT SERPL-MCNC: 0.94 MG/DL (ref 0.5–1.3)
EGFRCR SERPLBLD CKD-EPI 2021: >90 ML/MIN/1.73M*2
EST. AVERAGE GLUCOSE BLD GHB EST-MCNC: 137 MG/DL
GLUCOSE SERPL-MCNC: 107 MG/DL (ref 74–99)
HBA1C MFR BLD: 6.4 %
POTASSIUM SERPL-SCNC: 4.5 MMOL/L (ref 3.5–5.3)
SODIUM SERPL-SCNC: 138 MMOL/L (ref 136–145)

## 2024-03-08 PROCEDURE — 83036 HEMOGLOBIN GLYCOSYLATED A1C: CPT

## 2024-03-08 PROCEDURE — 36415 COLL VENOUS BLD VENIPUNCTURE: CPT

## 2024-03-08 PROCEDURE — 82306 VITAMIN D 25 HYDROXY: CPT

## 2024-03-08 PROCEDURE — 80048 BASIC METABOLIC PNL TOTAL CA: CPT

## 2024-04-03 ENCOUNTER — OFFICE VISIT (OUTPATIENT)
Dept: PRIMARY CARE | Facility: CLINIC | Age: 64
End: 2024-04-03
Payer: COMMERCIAL

## 2024-04-03 VITALS
BODY MASS INDEX: 40.48 KG/M2 | DIASTOLIC BLOOD PRESSURE: 74 MMHG | WEIGHT: 305.4 LBS | OXYGEN SATURATION: 92 % | HEIGHT: 73 IN | HEART RATE: 73 BPM | SYSTOLIC BLOOD PRESSURE: 138 MMHG | TEMPERATURE: 98.5 F

## 2024-04-03 DIAGNOSIS — K21.9 GASTROESOPHAGEAL REFLUX DISEASE WITHOUT ESOPHAGITIS: ICD-10-CM

## 2024-04-03 DIAGNOSIS — G47.61 PERIODIC LIMB MOVEMENT: ICD-10-CM

## 2024-04-03 DIAGNOSIS — R33.9 URINARY RETENTION: ICD-10-CM

## 2024-04-03 DIAGNOSIS — E11.9 TYPE 2 DIABETES MELLITUS WITHOUT COMPLICATION, WITHOUT LONG-TERM CURRENT USE OF INSULIN (MULTI): ICD-10-CM

## 2024-04-03 DIAGNOSIS — K76.0 NAFLD (NONALCOHOLIC FATTY LIVER DISEASE): ICD-10-CM

## 2024-04-03 DIAGNOSIS — F17.200 CURRENT SMOKER: ICD-10-CM

## 2024-04-03 DIAGNOSIS — R33.8 BENIGN PROSTATIC HYPERPLASIA WITH URINARY RETENTION: ICD-10-CM

## 2024-04-03 DIAGNOSIS — G89.29 CHRONIC BILATERAL LOW BACK PAIN WITHOUT SCIATICA: ICD-10-CM

## 2024-04-03 DIAGNOSIS — Z79.899 HIGH RISK MEDICATION USE: ICD-10-CM

## 2024-04-03 DIAGNOSIS — E55.9 VITAMIN D DEFICIENCY: ICD-10-CM

## 2024-04-03 DIAGNOSIS — G47.33 OSA (OBSTRUCTIVE SLEEP APNEA): ICD-10-CM

## 2024-04-03 DIAGNOSIS — E78.2 MIXED HYPERLIPIDEMIA: Primary | ICD-10-CM

## 2024-04-03 DIAGNOSIS — N40.1 BENIGN PROSTATIC HYPERPLASIA WITH URINARY RETENTION: ICD-10-CM

## 2024-04-03 DIAGNOSIS — M54.50 CHRONIC BILATERAL LOW BACK PAIN WITHOUT SCIATICA: ICD-10-CM

## 2024-04-03 PROBLEM — K04.7 INFECTION OF TOOTH: Status: ACTIVE | Noted: 2024-04-03

## 2024-04-03 PROCEDURE — 80365 DRUG SCREENING OXYCODONE: CPT

## 2024-04-03 PROCEDURE — 80358 DRUG SCREENING METHADONE: CPT

## 2024-04-03 PROCEDURE — 80354 DRUG SCREENING FENTANYL: CPT

## 2024-04-03 PROCEDURE — 80373 DRUG SCREENING TRAMADOL: CPT

## 2024-04-03 PROCEDURE — 80368 SEDATIVE HYPNOTICS: CPT

## 2024-04-03 PROCEDURE — 3078F DIAST BP <80 MM HG: CPT | Performed by: FAMILY MEDICINE

## 2024-04-03 PROCEDURE — 3044F HG A1C LEVEL LT 7.0%: CPT | Performed by: FAMILY MEDICINE

## 2024-04-03 PROCEDURE — 99214 OFFICE O/P EST MOD 30 MIN: CPT | Performed by: FAMILY MEDICINE

## 2024-04-03 PROCEDURE — 80361 OPIATES 1 OR MORE: CPT

## 2024-04-03 PROCEDURE — 3075F SYST BP GE 130 - 139MM HG: CPT | Performed by: FAMILY MEDICINE

## 2024-04-03 PROCEDURE — 80346 BENZODIAZEPINES1-12: CPT

## 2024-04-03 RX ORDER — MELOXICAM 15 MG/1
15 TABLET ORAL DAILY
Qty: 30 TABLET | Refills: 11 | Status: CANCELLED | OUTPATIENT
Start: 2024-04-03 | End: 2025-04-03

## 2024-04-03 RX ORDER — TRAMADOL HYDROCHLORIDE 50 MG/1
50-100 TABLET ORAL EVERY 12 HOURS PRN
Qty: 60 TABLET | Refills: 0 | Status: SHIPPED | OUTPATIENT
Start: 2024-04-03 | End: 2024-05-06 | Stop reason: SDUPTHER

## 2024-04-03 RX ORDER — ROPINIROLE 0.5 MG/1
0.5 TABLET, FILM COATED ORAL NIGHTLY
Qty: 90 TABLET | Refills: 0 | Status: SHIPPED | OUTPATIENT
Start: 2024-04-03 | End: 2024-07-02

## 2024-04-03 RX ORDER — TAMSULOSIN HYDROCHLORIDE 0.4 MG/1
0.4 CAPSULE ORAL
Qty: 90 CAPSULE | Refills: 0 | Status: SHIPPED | OUTPATIENT
Start: 2024-04-03 | End: 2024-07-02

## 2024-04-03 RX ORDER — ESOMEPRAZOLE MAGNESIUM 40 MG/1
40 CAPSULE, DELAYED RELEASE ORAL DAILY
Qty: 90 CAPSULE | Refills: 0 | Status: SHIPPED | OUTPATIENT
Start: 2024-04-03 | End: 2024-07-02

## 2024-04-03 RX ORDER — IBUPROFEN 800 MG/1
800 TABLET ORAL EVERY 6 HOURS PRN
COMMUNITY
Start: 2024-01-29

## 2024-04-03 RX ORDER — DUTASTERIDE 0.5 MG/1
0.5 CAPSULE, LIQUID FILLED ORAL DAILY
Qty: 90 CAPSULE | Refills: 0 | Status: SHIPPED | OUTPATIENT
Start: 2024-04-03 | End: 2024-07-02

## 2024-04-03 RX ORDER — OXYBUTYNIN CHLORIDE 5 MG/1
5 TABLET ORAL 2 TIMES DAILY
Qty: 180 TABLET | Refills: 0 | Status: SHIPPED | OUTPATIENT
Start: 2024-04-03 | End: 2024-07-02

## 2024-04-03 NOTE — PROGRESS NOTES
Subjective   Patient ID: Ivan Johnson is a 63 y.o. male who presents for Follow-up.    HPI     No new concerns at this time.    Patient states he has not taken gabapentin he was recently prescribed by another provider due to vivid dreams and interrupted sleep.     No other concerns     Patient has hyperlipidemia.  Patient tries to limit the amount of fatty foods and high cholesterol foods that he consumes  His hyperlipidemia is currently treated with dietary/ lifestyle changes.      He has GERD.  He was treated with Esomeprazole.   He denies any breakthrough reflux symptoms.      Patient has obstructive sleep apnea.  Patient utilizes a CPAP machine on a nightly basis.  He is tolerating the machine well and does feel well rested in the mornings.     He has periodic leg movement disorder.  Symptoms were stable when he was on Requip.     He has chronic lower back pain and hip pain.  This has been effectively treated with Tramadol as needed.  NSAIDs have not provided adequate relief in the past.     He has a history of BPH/urinary frequency/urinary retention.  Symptoms have been well-controlled on the current combination of oxybutynin and Flomax.      He has nonalcoholic fatty liver disease.  Pt underwent laboratory evaluation as well as an ultrasound of the liver.   Patient has not had an actual biopsy performed.      He has vitamin D deficiency.  Patient continues to take over-the-counter vitamin D supplementation of 1000 units daily.       OARRS:  Sushant Wiseman DO on 4/3/2024  6:32 PM  I have personally reviewed the OARRS report for Ivan Johnson. I have considered the risks of abuse, dependence, addiction and diversion and I believe that it is clinically appropriate for Ivan Johnson to be prescribed this medication    Is the patient prescribed a combination of a benzodiazepine and opioid?  No    Last Urine Drug Screen / ordered today: Yes  Recent Results (from the past 8760 hour(s))   OPIATE/OPIOID/BENZO  PRESCRIPTION COMPLIANCE    Collection Time: 04/06/23 11:08 AM   Result Value Ref Range    DRUG SCREEN COMMENT URINE SEE BELOW     Creatine, Urine 143.2 mg/dL    Amphetamine Screen, Urine PRESUMPTIVE NEGATIVE NEGATIVE    Barbiturate Screen, Urine PRESUMPTIVE NEGATIVE NEGATIVE    Cannabinoid Screen, Urine PRESUMPTIVE NEGATIVE NEGATIVE    Cocaine Screen, Urine PRESUMPTIVE NEGATIVE NEGATIVE    PCP Screen, Urine PRESUMPTIVE NEGATIVE NEGATIVE    7-Aminoclonazepam <25 Cutoff <25 ng/mL    Alpha-Hydroxyalprazolam <25 Cutoff <25 ng/mL    Alpha-Hydroxymidazolam <25 Cutoff <25 ng/mL    Alprazolam <25 Cutoff <25 ng/mL    Chlordiazepoxide <25 Cutoff <25 ng/mL    Clonazepam <25 Cutoff <25 ng/mL    Diazepam <25 Cutoff <25 ng/mL    Lorazepam <25 Cutoff <25 ng/mL    Midazolam <25 Cutoff <25 ng/mL    Nordiazepam <25 Cutoff <25 ng/mL    Oxazepam <25 Cutoff <25 ng/mL    Temazepam <25 Cutoff <25 ng/mL    Zolpidem <25 Cutoff <25 ng/mL    Zolpidem Metabolite (ZCA) <25 Cutoff <25 ng/mL    6-Acetylmorphine <25 Cutoff <25 ng/mL    Codeine <50 Cutoff <50 ng/mL    Hydrocodone <25 Cutoff <25 ng/mL    Hydromorphone <25 Cutoff <25 ng/mL    Morphine Urine <50 Cutoff <50 ng/mL    Norhydrocodone <25 Cutoff <25 ng/mL    Noroxycodone <25 Cutoff <25 ng/mL    Oxycodone <25 Cutoff <25 ng/mL    Oxymorphone <25 Cutoff <25 ng/mL    Tramadol 830 (A) Cutoff <50 ng/mL    O-Desmethyltramadol >1000 (A) Cutoff <50 ng/mL    Fentanyl <2.5 Cutoff<2.5 ng/mL    Norfentanyl <2.5 Cutoff<2.5 ng/mL    METHADONE CONFIRMATION,URINE <25 Cutoff <25 ng/mL    EDDP <25 Cutoff <25 ng/mL     N/A    Clinical rationale for not completing a Urine Drug Screen: UDS ordered today      Controlled Substance Agreement:  Date of the Last Agreement: 4/3/2024  Reviewed Controlled Substance Agreement including but not limited to the benefits, risks, and alternatives to treatment with a Controlled Substance medication(s).    Opioids:  What is the patient's goal of therapy? Reduce pain as  needed  Is this being achieved with current treatment? yes    I have calculated the patient's Morphine Dose Equivalent (MED):   I have considered referral to Pain Management and/or a specialist, and do not feel it is necessary at this time.    I feel that it is clinically indicated to continue this current medication regimen after consideration of alternative therapies, and other non-opioid treatment.    Opioid Risk Screening:  Family History of Substance Abuse  Alcohol: 0 (4/5/2023  6:00 PM)  Illegal Drugs: 0 (4/5/2023  6:00 PM)  Prescription Drugs: 0 (4/5/2023  6:00 PM)    Personal History of Substance Abuse  Alcohol: 0 (4/5/2023  6:00 PM)  Illegal Drugs: 0 (4/5/2023  6:00 PM)  Prescription Drugs: 0 (4/5/2023  6:00 PM)    Patient Age (16-45)  Age (16-45): 0 (4/5/2023  6:00 PM)    History of Preadolescent Sexual Abuse  History of Preadolescent Sexual Abuse: .0 (4/5/2023  6:00 PM)    Psychological Disease  Attention Deficit Disorder, Obsessive Compulsive Disorder, Bipolar, Schizophrenia: 0 (4/5/2023  6:00 PM)  Depression: 0 (4/5/2023  6:00 PM)    Total Score  Total Score: 0 (4/5/2023  6:00 PM)    Total Score Risk Category  TOTAL SCORE CATEGORY: Low Risk (0-3) (4/5/2023  6:00 PM)        Pain Assessment:  Analgesia  What was your pain level on average during the past week?: 4  What was your pain level at its worst during the past week?: 7  What percentage of your pain has been relieved during the past week?: 40 %  Is the amount of pain relief you are now obtaining from your current pain relievers enough to make a real difference in your life?: Y  Query to Clinician: Is the patient's pain relief clinically significant?: Yes    Activities of Daily Living  Physical Functioning: Same  Family Relationships: Same  Social Relationships: Same  Mood: Same  Sleep Patterns: Same  Overall Functioning: Same    Adverse Events  Is patient experiencing any side effects from current pain relievers?: N  Patient's Overall Severity of  "Side Effects: None      Assessment  Is your overall impression that this patient is benefiting from opioid therapy?: Yes  Specific Analgesic Plan: Continue present regimen            Review of Systems  Constitutional: Patient denies any fever, chills, loss of appetite, or unexplained weight loss.  Cardiovascular: Patient denies any chest pain, shortness of breath with exertion, tachycardia, palpitations, orthopnea, or paroxysmal nocturnal dyspnea.  Respiratory: Patient denies any cough, shortness breath, or wheezing.  Skin: Denies any rashes or skin lesions.   Neurology: Patient denies any new motor or sensory losses.  Denies any numbness, tingling, weakness, and incoordination of the extremities.  Patient also denies any tremor, seizures, or gait instability.  Endocrinology: Denies any polyuria, polydipsia, polyphagia, or heat/cold intolerance.    See HPI also.    Objective   /74   Pulse 73   Temp 36.9 °C (98.5 °F)   Ht 1.854 m (6' 1\")   Wt 139 kg (305 lb 6.4 oz)   SpO2 92%   BMI 40.29 kg/m²     Physical Exam  General Appearance: Alert and cooperative, in no acute distress, well-developed/well-nourished.  Neck: Supple and without adenopathy or rigidity.  There is no JVD at 90° and no carotid bruits are noted.  There is no thyromegaly, thyroid tenderness, or palpable thyroid nodules.  Heart: Regular rate and rhythm without murmur or ectopy.  Respiratory: Lungs are clear to auscultation bilaterally with good air exchange.  Good respiratory effort and no accessory muscle use.  Skin: Good turgor, moist, warm and without rashes or lesions.  Neurological exam: Alert and oriented ×3, no tremor, normal gait.  Extremities: No clubbing, cyanosis, or edema        Assessment/Plan        Hyperlipidemia: Dietary changes, exercise, and weight loss were encouraged.  We will continue to monitor.     DM Type 2: Stable based on symptoms.  Lab Results   Component Value Date    HGBA1C 6.4 (H) 03/08/2024   He is not " "experiencing any polyuria, polydipsia, polyphagia.  Dietary changes, exercise, and maintenance of a healthy weight were discussed at length.  We discussed the need for more aggressive treatment if he is unable to maintain his A1c in an acceptable range.  Pt encouraged to work on weight loss.     Chronic back pain: Patient will continue with tramadol for pain as needed.  NSAIDs have been ineffective for his pain in the past.   His pain seems to be worsening over time.  10/4/2023:  We referred him to pain mgmt.  4/3/2024:  Pt reports that Pain mgmt started him on meloxicam and gabapentin.  He stopped the gabapentin shortly after starting it as it was giving him \"strange dreams\" that would wake him up several times per night.  This resolved once he stopped the medication.  Advised to discuss with pain mgmt as he has an upcoming appt.     Vitamin D deficiency: Stable based on last vitamin D labs.  Patient to continue vitamin D supplementation.     NAFLD: He was encouraged to follow a low-fat diet and maintain a healthy weight.     Periodic leg movement disorder: Stable on the current treatment plan.    Patient to continue Requip at current dose.     BPH / urinary retention: Stable based on symptoms.  Patient to continue medications.     Sleep apnea: Stable.   We will continue with his CPAP therapy.   He continues to benefit from the CPAP therapy.  7/5/2023: CPAP Titration test ordered today.  1/3/2024:  His insurance is requiring a new diagnostic sleep study be ordered in order to cover his CPAP treatment.  His last diagnostic results are not available as that practice no longer exists and records are not available that far back.     Current smoker:   Pt has been encouraged to work on smoking cessation and available smoking cessation aids were discussed.  The importance of smoking cessation was discussed.   Patient understands that continued smoking will increase the risks of lung disease, vascular disease, and multiple " malignancies.      GERD: Stable with the current medication and appropriate dietary changes.  Patient to continue PPI therapy.       PSA DUE 9/16/2024       Orders Placed This Encounter   Procedures    Basic Metabolic Panel    Hemoglobin A1C    Opiate/Opioid/Benzo Prescription Compliance    Screen Opiate/Opioid/Benzo Prescription Compliance    Confirmation Opiate/Opioid/Benzo Prescription Compliance    OOB Internal Tracking     Requested Prescriptions     Signed Prescriptions Disp Refills    dutasteride (Avodart) 0.5 mg capsule 90 capsule 0     Sig: Take 1 capsule (0.5 mg) by mouth once daily.    oxybutynin (Ditropan) 5 mg tablet 180 tablet 0     Sig: Take 1 tablet (5 mg) by mouth 2 times a day.    tamsulosin (Flomax) 0.4 mg 24 hr capsule 90 capsule 0     Sig: Take 1 capsule (0.4 mg) by mouth once daily.    esomeprazole (NexIUM) 40 mg DR capsule 90 capsule 0     Sig: Take 1 capsule (40 mg) by mouth once daily.    rOPINIRole (Requip) 0.5 mg tablet 90 tablet 0     Sig: Take 1 tablet (0.5 mg) by mouth once daily at bedtime.    traMADol (Ultram) 50 mg tablet 60 tablet 0     Sig: Take 1-2 tablets ( mg) by mouth every 12 hours if needed for severe pain (7 - 10).

## 2024-04-05 ENCOUNTER — APPOINTMENT (OUTPATIENT)
Dept: PAIN MEDICINE | Facility: CLINIC | Age: 64
End: 2024-04-05
Payer: COMMERCIAL

## 2024-04-09 LAB
1OH-MIDAZOLAM UR CFM-MCNC: <25 NG/ML
6MAM UR CFM-MCNC: <25 NG/ML
7AMINOCLONAZEPAM UR CFM-MCNC: <25 NG/ML
A-OH ALPRAZ UR CFM-MCNC: <25 NG/ML
ALPRAZ UR CFM-MCNC: <25 NG/ML
CHLORDIAZEP UR CFM-MCNC: <25 NG/ML
CLONAZEPAM UR CFM-MCNC: <25 NG/ML
CODEINE UR CFM-MCNC: <50 NG/ML
DIAZEPAM UR CFM-MCNC: <25 NG/ML
EDDP UR CFM-MCNC: <25 NG/ML
FENTANYL UR CFM-MCNC: <2.5 NG/ML
HYDROCODONE CTO UR CFM-MCNC: <25 NG/ML
HYDROMORPHONE UR CFM-MCNC: <25 NG/ML
LORAZEPAM UR CFM-MCNC: <25 NG/ML
METHADONE UR CFM-MCNC: <25 NG/ML
MIDAZOLAM UR CFM-MCNC: <25 NG/ML
MORPHINE UR CFM-MCNC: <50 NG/ML
NORDIAZEPAM UR CFM-MCNC: <25 NG/ML
NORFENTANYL UR CFM-MCNC: <2.5 NG/ML
NORHYDROCODONE UR CFM-MCNC: <25 NG/ML
NOROXYCODONE UR CFM-MCNC: <25 NG/ML
NORTRAMADOL UR-MCNC: >1000 NG/ML
OXAZEPAM UR CFM-MCNC: <25 NG/ML
OXYCODONE UR CFM-MCNC: <25 NG/ML
OXYMORPHONE UR CFM-MCNC: <25 NG/ML
TEMAZEPAM UR CFM-MCNC: <25 NG/ML
TRAMADOL UR CFM-MCNC: 957 NG/ML
ZOLPIDEM UR CFM-MCNC: <25 NG/ML
ZOLPIDEM UR-MCNC: <25 NG/ML

## 2024-05-06 ENCOUNTER — TELEPHONE (OUTPATIENT)
Dept: PRIMARY CARE | Facility: CLINIC | Age: 64
End: 2024-05-06
Payer: COMMERCIAL

## 2024-05-06 DIAGNOSIS — G89.29 CHRONIC BILATERAL LOW BACK PAIN WITHOUT SCIATICA: ICD-10-CM

## 2024-05-06 DIAGNOSIS — M54.50 CHRONIC BILATERAL LOW BACK PAIN WITHOUT SCIATICA: ICD-10-CM

## 2024-05-06 NOTE — TELEPHONE ENCOUNTER
Patient phones the office today w/ request to renew his Tramadol 50mg 1-2 TABS q12hrs/prn to be sent to [facility] on [address]     Thank you.

## 2024-05-07 RX ORDER — TRAMADOL HYDROCHLORIDE 50 MG/1
50-100 TABLET ORAL EVERY 12 HOURS PRN
Qty: 60 TABLET | Refills: 0 | Status: SHIPPED | OUTPATIENT
Start: 2024-05-07 | End: 2024-06-06 | Stop reason: SDUPTHER

## 2024-05-16 ENCOUNTER — TELEPHONE (OUTPATIENT)
Dept: PRIMARY CARE | Facility: CLINIC | Age: 64
End: 2024-05-16
Payer: COMMERCIAL

## 2024-05-16 DIAGNOSIS — Z12.11 COLON CANCER SCREENING: Primary | ICD-10-CM

## 2024-06-06 DIAGNOSIS — M54.50 CHRONIC BILATERAL LOW BACK PAIN WITHOUT SCIATICA: ICD-10-CM

## 2024-06-06 DIAGNOSIS — G89.29 CHRONIC BILATERAL LOW BACK PAIN WITHOUT SCIATICA: ICD-10-CM

## 2024-06-10 RX ORDER — TRAMADOL HYDROCHLORIDE 50 MG/1
50-100 TABLET ORAL EVERY 12 HOURS PRN
Qty: 60 TABLET | Refills: 0 | Status: SHIPPED | OUTPATIENT
Start: 2024-06-10

## 2024-07-03 ENCOUNTER — APPOINTMENT (OUTPATIENT)
Dept: PRIMARY CARE | Facility: CLINIC | Age: 64
End: 2024-07-03
Payer: COMMERCIAL

## 2024-07-03 VITALS
OXYGEN SATURATION: 91 % | HEART RATE: 78 BPM | BODY MASS INDEX: 40.29 KG/M2 | TEMPERATURE: 98.6 F | DIASTOLIC BLOOD PRESSURE: 74 MMHG | SYSTOLIC BLOOD PRESSURE: 126 MMHG | HEIGHT: 73 IN

## 2024-07-03 DIAGNOSIS — E78.2 MIXED HYPERLIPIDEMIA: Primary | ICD-10-CM

## 2024-07-03 DIAGNOSIS — F17.200 CURRENT SMOKER: ICD-10-CM

## 2024-07-03 DIAGNOSIS — R33.8 BENIGN PROSTATIC HYPERPLASIA WITH URINARY RETENTION: ICD-10-CM

## 2024-07-03 DIAGNOSIS — R33.9 URINARY RETENTION: ICD-10-CM

## 2024-07-03 DIAGNOSIS — G47.61 PERIODIC LIMB MOVEMENT: ICD-10-CM

## 2024-07-03 DIAGNOSIS — Z79.899 HIGH RISK MEDICATION USE: ICD-10-CM

## 2024-07-03 DIAGNOSIS — Z12.5 PROSTATE CANCER SCREENING: ICD-10-CM

## 2024-07-03 DIAGNOSIS — N40.1 BENIGN PROSTATIC HYPERPLASIA WITH URINARY RETENTION: ICD-10-CM

## 2024-07-03 DIAGNOSIS — G89.29 CHRONIC BILATERAL LOW BACK PAIN WITHOUT SCIATICA: ICD-10-CM

## 2024-07-03 DIAGNOSIS — M54.50 CHRONIC BILATERAL LOW BACK PAIN WITHOUT SCIATICA: ICD-10-CM

## 2024-07-03 DIAGNOSIS — E11.9 TYPE 2 DIABETES MELLITUS WITHOUT COMPLICATION, WITHOUT LONG-TERM CURRENT USE OF INSULIN (MULTI): ICD-10-CM

## 2024-07-03 DIAGNOSIS — E55.9 VITAMIN D DEFICIENCY: ICD-10-CM

## 2024-07-03 DIAGNOSIS — K21.9 GASTROESOPHAGEAL REFLUX DISEASE WITHOUT ESOPHAGITIS: ICD-10-CM

## 2024-07-03 DIAGNOSIS — G47.33 OSA (OBSTRUCTIVE SLEEP APNEA): ICD-10-CM

## 2024-07-03 PROCEDURE — 99214 OFFICE O/P EST MOD 30 MIN: CPT | Performed by: FAMILY MEDICINE

## 2024-07-03 PROCEDURE — 3044F HG A1C LEVEL LT 7.0%: CPT | Performed by: FAMILY MEDICINE

## 2024-07-03 PROCEDURE — 3078F DIAST BP <80 MM HG: CPT | Performed by: FAMILY MEDICINE

## 2024-07-03 PROCEDURE — 3074F SYST BP LT 130 MM HG: CPT | Performed by: FAMILY MEDICINE

## 2024-07-03 RX ORDER — DUTASTERIDE 0.5 MG/1
0.5 CAPSULE, LIQUID FILLED ORAL DAILY
Qty: 90 CAPSULE | Refills: 0 | Status: SHIPPED | OUTPATIENT
Start: 2024-07-03 | End: 2024-10-01

## 2024-07-03 RX ORDER — OXYBUTYNIN CHLORIDE 5 MG/1
5 TABLET ORAL 2 TIMES DAILY
Qty: 180 TABLET | Refills: 0 | Status: SHIPPED | OUTPATIENT
Start: 2024-07-03 | End: 2024-10-01

## 2024-07-03 RX ORDER — ESOMEPRAZOLE MAGNESIUM 40 MG/1
40 CAPSULE, DELAYED RELEASE ORAL DAILY
Qty: 90 CAPSULE | Refills: 0 | Status: SHIPPED | OUTPATIENT
Start: 2024-07-03 | End: 2024-10-01

## 2024-07-03 RX ORDER — TRAMADOL HYDROCHLORIDE 50 MG/1
50-100 TABLET ORAL EVERY 12 HOURS PRN
Qty: 60 TABLET | Refills: 0 | Status: SHIPPED | OUTPATIENT
Start: 2024-07-03

## 2024-07-03 RX ORDER — TAMSULOSIN HYDROCHLORIDE 0.4 MG/1
0.4 CAPSULE ORAL
Qty: 90 CAPSULE | Refills: 0 | Status: SHIPPED | OUTPATIENT
Start: 2024-07-03 | End: 2024-10-01

## 2024-07-03 RX ORDER — NALOXONE HYDROCHLORIDE 4 MG/.1ML
4 SPRAY NASAL AS NEEDED
Qty: 2 EACH | Refills: 0 | Status: SHIPPED | OUTPATIENT
Start: 2024-07-03

## 2024-07-03 RX ORDER — ROPINIROLE 0.5 MG/1
0.5 TABLET, FILM COATED ORAL NIGHTLY
Qty: 90 TABLET | Refills: 0 | Status: SHIPPED | OUTPATIENT
Start: 2024-07-03 | End: 2024-10-01

## 2024-07-03 ASSESSMENT — LIFESTYLE VARIABLES: TOTAL SCORE: 0

## 2024-07-03 NOTE — PROGRESS NOTES
"Subjective   Patient ID: Ivan Johnson is a 63 y.o. male who presents for Follow-up.    HPI     Patient states that he started taking his gabapentin again and he experiences an \"electrical feeling\" in both of his legs and hands.      No new concerns at this time.       Labs: (ordered 04/03/2024, not yet done)  Colonoscopy: 5/2024  UDS&CSA: 04/03/2024    Patient has hyperlipidemia.  Patient tries to limit the amount of fatty foods and high cholesterol foods that he consumes  His hyperlipidemia is currently treated with dietary/ lifestyle changes.      He has GERD.  He was treated with Esomeprazole.   He denies any breakthrough reflux symptoms.      Patient has obstructive sleep apnea.  Patient utilizes a CPAP machine on a nightly basis.  He is tolerating the machine well and does feel well rested in the mornings.     He has periodic leg movement disorder.  Symptoms were stable when he was on Requip.     He has chronic lower back pain and hip pain.  This has been effectively treated with Tramadol as needed.  NSAIDs have not provided adequate relief in the past.     He has a history of BPH/urinary frequency/urinary retention.  Symptoms have been well-controlled on the current combination of oxybutynin and Flomax.      He has nonalcoholic fatty liver disease.  Pt underwent laboratory evaluation as well as an ultrasound of the liver.   Patient has not had an actual biopsy performed.      He has vitamin D deficiency.  Patient continues to take over-the-counter vitamin D supplementation of 1000 units daily.     OARRS:  Sushant Wiseman DO on 7/3/2024  6:10 PM  I have personally reviewed the OARRS report for Ivan Johnson. I have considered the risks of abuse, dependence, addiction and diversion and I believe that it is clinically appropriate for Ivan Johnson to be prescribed this medication    Is the patient prescribed a combination of a benzodiazepine and opioid?  Yes, I feel it is clincially indicated to continue the " medication and have discussed with the patient risks/benefits/alternatives.    Last Urine Drug Screen / ordered today: No  Recent Results (from the past 8760 hour(s))   Confirmation Opiate/Opioid/Benzo Prescription Compliance    Collection Time: 04/03/24  6:57 PM   Result Value Ref Range    Clonazepam <25 <25 ng/mL    7-Aminoclonazepam <25 <25 ng/mL    Alprazolam <25 <25 ng/mL    Alpha-Hydroxyalprazolam <25 <25 ng/mL    Midazolam <25 <25 ng/mL    Alpha-Hydroxymidazolam <25 <25 ng/mL    Chlordiazepoxide <25 <25 ng/mL    Diazepam <25 <25 ng/mL    Nordiazepam <25 <25 ng/mL    Temazepam <25 <25 ng/mL    Oxazepam <25 <25 ng/mL    Lorazepam <25 <25 ng/mL    Methadone <25 <25 ng/mL    EDDP <25 <25 ng/mL    6-Acetylmorphine <25 <25 ng/mL    Codeine <50 <50 ng/mL    Hydrocodone <25 <25 ng/mL    Hydromorphone <25 <25 ng/mL    Morphine  <50 <50 ng/mL    Norhydrocodone <25 <25 ng/mL    Noroxycodone <25 <25 ng/mL    Oxycodone <25 <25 ng/mL    Oxymorphone <25 <25 ng/mL    Fentanyl <2.5 <2.5 ng/mL    Norfentanyl <2.5 <2.5 ng/mL    Tramadol 957 (H) <50 ng/mL    O-Desmethyltramadol >1,000 (H) <50 ng/mL    Zolpidem <25 <25 ng/mL    Zolpidem Metabolite (ZCA) <25 <25 ng/mL     Results are as expected.         Controlled Substance Agreement:  Date of the Last Agreement: 4/3/06959  Reviewed Controlled Substance Agreement including but not limited to the benefits, risks, and alternatives to treatment with a Controlled Substance medication(s).    Opioids:  What is the patient's goal of therapy? Reduce pain on as needed basis  Is this being achieved with current treatment? yes    I have calculated the patient's Morphine Dose Equivalent (MED):   I have considered referral to Pain Management and/or a specialist, and do not feel it is necessary at this time.    I feel that it is clinically indicated to continue this current medication regimen after consideration of alternative therapies, and other non-opioid treatment.    Opioid Risk  Screening:  Family History of Substance Abuse  Alcohol: 0 (7/3/2024  6:00 PM)  Illegal Drugs: 0 (7/3/2024  6:00 PM)  Prescription Drugs: 0 (7/3/2024  6:00 PM)    Personal History of Substance Abuse  Alcohol: 0 (7/3/2024  6:00 PM)  Illegal Drugs: 0 (7/3/2024  6:00 PM)  Prescription Drugs: 0 (7/3/2024  6:00 PM)    Patient Age (16-45)  Age (16-45): 0 (7/3/2024  6:00 PM)    History of Preadolescent Sexual Abuse  History of Preadolescent Sexual Abuse: .0 (7/3/2024  6:00 PM)    Psychological Disease  Attention Deficit Disorder, Obsessive Compulsive Disorder, Bipolar, Schizophrenia: 0 (7/3/2024  6:00 PM)  Depression: 0 (7/3/2024  6:00 PM)    Total Score  Total Score: 0 (7/3/2024  6:00 PM)    Total Score Risk Category  TOTAL SCORE CATEGORY: Low Risk (0-3) (7/3/2024  6:00 PM)        Pain Assessment:  Analgesia  What was your pain level on average during the past week?: 4  What was your pain level at its worst during the past week?: 7  What percentage of your pain has been relieved during the past week?: 45 %  Is the amount of pain relief you are now obtaining from your current pain relievers enough to make a real difference in your life?: Y  Query to Clinician: Is the patient's pain relief clinically significant?: Yes    Activities of Daily Living  Physical Functioning: Same  Family Relationships: Same  Social Relationships: Same  Mood: Same  Sleep Patterns: Same  Overall Functioning: Same    Adverse Events  Is patient experiencing any side effects from current pain relievers?: N  Patient's Overall Severity of Side Effects: None      Assessment  Is your overall impression that this patient is benefiting from opioid therapy?: Yes  Specific Analgesic Plan: Continue present regimen        Review of Systems  Constitutional: Patient denies any fever, chills, loss of appetite, or unexplained weight loss.  Cardiovascular: Patient denies any chest pain, shortness of breath with exertion, tachycardia, palpitations, orthopnea, or  "paroxysmal nocturnal dyspnea.  Respiratory: Patient denies any cough, shortness breath, or wheezing.  Gastrointestinal: Patient denies any nausea, vomiting, diarrhea, constipation, melena, hematochezia, or reflux symptoms  Skin: Denies any rashes or skin lesions  Neurology: Patient denies any new motor or sensory losses. Denies any numbness, tingling, weakness, and incoordination of the extremities. Patient also denies any tremor, seizures, or gait instability.  Endocrinology: Denies any polyuria, polydipsia, polyphagia, or heat/cold intolerance.      Objective   /74   Pulse 78   Temp 37 °C (98.6 °F)   Ht 1.854 m (6' 1\")   SpO2 91%   BMI 40.29 kg/m²     Physical Exam  General Appearance: Alert and cooperative, in no acute distress, well-developed/well-nourished, obese male.    Neck: Supple and without adenopathy or rigidity. There is no JVD at 90° and no carotid bruits are noted. There is no thyromegaly, thyroid tenderness, or palpable thyroid nodules.  Heart: Regular rate and rhythm without murmur or ectopy.  Lungs: Clear to auscultation bilaterally with good air exchange.  Skin: Good turgor, moist, warm and without rashes or lesions.  Neurological exam: Alert and oriented ×3, no tremor, normal gait.  Extremities: No clubbing, cyanosis, or edema      Assessment/Plan     Hyperlipidemia: Dietary changes, exercise, and weight loss were encouraged.  We will continue to monitor.     DM Type 2: Stable based on symptoms.  Lab Results   Component Value Date    HGBA1C 6.4 (H) 03/08/2024   He is not experiencing any polyuria, polydipsia, polyphagia.  Dietary changes, exercise, and maintenance of a healthy weight were discussed at length.  We discussed the need for more aggressive treatment if he is unable to maintain his A1c in an acceptable range.  Pt encouraged to work on weight loss.     Chronic back pain: Patient will continue with tramadol for pain as needed.  NSAIDs have been ineffective for his pain in the " "past.   His pain seems to be worsening over time.  10/4/2023:  We referred him to pain mgmt.  4/3/2024:  Pt reports that Pain mgmt started him on meloxicam and gabapentin.  He stopped the gabapentin shortly after starting it as it was giving him \"strange dreams\" that would wake him up several times per night.  This resolved once he stopped the medication.    7/3/2024:  Pt reported that he restarted the gabapentin and has been tolerating it well.     Vitamin D deficiency: Stable based on last vitamin D labs.  Patient to continue vitamin D supplementation.     Periodic leg movement disorder: Stable on the current treatment plan.    Patient to continue Requip at current dose.     BPH / urinary retention: Stable based on symptoms.  Patient to continue medications.     Sleep apnea:   Stable.   We will continue with his CPAP therapy.   He continues to benefit from the CPAP therapy and feels refreshed in the mornings.     Current smoker:   Pt has been encouraged to work on smoking cessation and available smoking cessation aids were discussed.  The importance of smoking cessation was discussed.   Patient understands that continued smoking will increase the risks of lung disease, vascular disease, and multiple malignancies.      GERD: Stable with the current medication and appropriate dietary changes.  Patient to continue PPI therapy.       PSA DUE 9/16/2024         Follow up in 3 months.       Scribe Attestation  By signing my name below, IShira Scribe   attest that this documentation has been prepared under the direction and in the presence of Sushant Wiseman DO.    Orders Placed This Encounter   Procedures    Basic Metabolic Panel    Hemoglobin A1C    Vitamin D 25-Hydroxy,Total (for eval of Vitamin D levels)    Prostate Specific Antigen, Screen     Requested Prescriptions     Signed Prescriptions Disp Refills    traMADol (Ultram) 50 mg tablet 60 tablet 0     Sig: Take 1-2 tablets ( mg) by mouth every 12 " hours if needed for severe pain (7 - 10).    esomeprazole (NexIUM) 40 mg DR capsule 90 capsule 0     Sig: Take 1 capsule (40 mg) by mouth once daily.    rOPINIRole (Requip) 0.5 mg tablet 90 tablet 0     Sig: Take 1 tablet (0.5 mg) by mouth once daily at bedtime.    dutasteride (Avodart) 0.5 mg capsule 90 capsule 0     Sig: Take 1 capsule (0.5 mg) by mouth once daily.    oxybutynin (Ditropan) 5 mg tablet 180 tablet 0     Sig: Take 1 tablet (5 mg) by mouth 2 times a day.    tamsulosin (Flomax) 0.4 mg 24 hr capsule 90 capsule 0     Sig: Take 1 capsule (0.4 mg) by mouth once daily.    naloxone (Narcan) 4 mg/0.1 mL nasal spray 2 each 0     Sig: Administer 1 spray (4 mg) into affected nostril(s) if needed for opioid reversal. May repeat every 2-3 minutes if needed, alternating nostrils, until medical assistance becomes available.

## 2024-07-03 NOTE — PATIENT INSTRUCTIONS
Follow up in 3 months.    It was a pleasure to see you today. Thank you for choosing us for your health care needs.    If you have lab or other testing completed and have not been informed of results within one week, please call the office for your results.    If you haven't done so, consider signing up for [facility], the [facility] personal health record. Ask the staff how you can get started.

## 2024-08-12 DIAGNOSIS — G89.29 CHRONIC BILATERAL LOW BACK PAIN WITHOUT SCIATICA: ICD-10-CM

## 2024-08-12 DIAGNOSIS — N40.1 BENIGN PROSTATIC HYPERPLASIA WITH URINARY RETENTION: ICD-10-CM

## 2024-08-12 DIAGNOSIS — R33.8 BENIGN PROSTATIC HYPERPLASIA WITH URINARY RETENTION: ICD-10-CM

## 2024-08-12 DIAGNOSIS — M54.50 CHRONIC BILATERAL LOW BACK PAIN WITHOUT SCIATICA: ICD-10-CM

## 2024-08-12 RX ORDER — TAMSULOSIN HYDROCHLORIDE 0.4 MG/1
0.4 CAPSULE ORAL
Qty: 90 CAPSULE | Refills: 0 | Status: CANCELLED | OUTPATIENT
Start: 2024-08-12 | End: 2024-11-10

## 2024-08-13 RX ORDER — TRAMADOL HYDROCHLORIDE 50 MG/1
50-100 TABLET ORAL EVERY 12 HOURS PRN
Qty: 60 TABLET | Refills: 0 | Status: SHIPPED | OUTPATIENT
Start: 2024-08-13

## 2024-09-09 DIAGNOSIS — M54.50 CHRONIC BILATERAL LOW BACK PAIN WITHOUT SCIATICA: ICD-10-CM

## 2024-09-09 DIAGNOSIS — G89.29 CHRONIC BILATERAL LOW BACK PAIN WITHOUT SCIATICA: ICD-10-CM

## 2024-09-09 RX ORDER — TRAMADOL HYDROCHLORIDE 50 MG/1
50-100 TABLET ORAL EVERY 12 HOURS PRN
Qty: 60 TABLET | Refills: 0 | Status: SHIPPED | OUTPATIENT
Start: 2024-09-09

## 2024-09-09 NOTE — TELEPHONE ENCOUNTER
Patient phones the office today w/ request to refill his Tramadol (Ultram) 50mg 1-2 TABS q12hrs/prn to be sent to [facility] on [address].     Thank you.

## 2024-10-09 ENCOUNTER — APPOINTMENT (OUTPATIENT)
Dept: PRIMARY CARE | Facility: CLINIC | Age: 64
End: 2024-10-09
Payer: COMMERCIAL

## 2024-10-09 VITALS
OXYGEN SATURATION: 95 % | TEMPERATURE: 97.2 F | BODY MASS INDEX: 38.05 KG/M2 | DIASTOLIC BLOOD PRESSURE: 75 MMHG | HEIGHT: 73 IN | WEIGHT: 287.1 LBS | SYSTOLIC BLOOD PRESSURE: 109 MMHG | HEART RATE: 86 BPM

## 2024-10-09 DIAGNOSIS — E11.9 TYPE 2 DIABETES MELLITUS WITHOUT COMPLICATION, WITHOUT LONG-TERM CURRENT USE OF INSULIN (MULTI): ICD-10-CM

## 2024-10-09 DIAGNOSIS — N40.1 BENIGN PROSTATIC HYPERPLASIA WITH URINARY RETENTION: ICD-10-CM

## 2024-10-09 DIAGNOSIS — K21.9 GASTROESOPHAGEAL REFLUX DISEASE WITHOUT ESOPHAGITIS: ICD-10-CM

## 2024-10-09 DIAGNOSIS — G47.33 OSA (OBSTRUCTIVE SLEEP APNEA): ICD-10-CM

## 2024-10-09 DIAGNOSIS — M54.50 CHRONIC BILATERAL LOW BACK PAIN WITHOUT SCIATICA: ICD-10-CM

## 2024-10-09 DIAGNOSIS — R33.8 BENIGN PROSTATIC HYPERPLASIA WITH URINARY RETENTION: ICD-10-CM

## 2024-10-09 DIAGNOSIS — G47.61 PERIODIC LIMB MOVEMENT: ICD-10-CM

## 2024-10-09 DIAGNOSIS — E55.9 VITAMIN D DEFICIENCY: ICD-10-CM

## 2024-10-09 DIAGNOSIS — E78.2 MIXED HYPERLIPIDEMIA: Primary | ICD-10-CM

## 2024-10-09 DIAGNOSIS — F17.200 CURRENT SMOKER: ICD-10-CM

## 2024-10-09 DIAGNOSIS — L30.1 DYSHYDROSIS: ICD-10-CM

## 2024-10-09 DIAGNOSIS — G89.29 CHRONIC BILATERAL LOW BACK PAIN WITHOUT SCIATICA: ICD-10-CM

## 2024-10-09 DIAGNOSIS — R33.9 URINARY RETENTION: ICD-10-CM

## 2024-10-09 PROCEDURE — 3008F BODY MASS INDEX DOCD: CPT | Performed by: FAMILY MEDICINE

## 2024-10-09 PROCEDURE — 3078F DIAST BP <80 MM HG: CPT | Performed by: FAMILY MEDICINE

## 2024-10-09 PROCEDURE — 99214 OFFICE O/P EST MOD 30 MIN: CPT | Performed by: FAMILY MEDICINE

## 2024-10-09 PROCEDURE — 3044F HG A1C LEVEL LT 7.0%: CPT | Performed by: FAMILY MEDICINE

## 2024-10-09 PROCEDURE — 3074F SYST BP LT 130 MM HG: CPT | Performed by: FAMILY MEDICINE

## 2024-10-09 RX ORDER — GABAPENTIN 300 MG/1
300 CAPSULE ORAL 2 TIMES DAILY
Qty: 60 CAPSULE | Refills: 11 | Status: SHIPPED | OUTPATIENT
Start: 2024-10-09

## 2024-10-09 RX ORDER — TRAMADOL HYDROCHLORIDE 50 MG/1
50-100 TABLET ORAL EVERY 12 HOURS PRN
Qty: 60 TABLET | Refills: 0 | Status: SHIPPED | OUTPATIENT
Start: 2024-10-09

## 2024-10-09 RX ORDER — MOMETASONE FUROATE 1 MG/G
OINTMENT TOPICAL DAILY
Qty: 15 G | Refills: 1 | Status: SHIPPED | OUTPATIENT
Start: 2024-10-09

## 2024-10-09 RX ORDER — ROPINIROLE 0.5 MG/1
0.5 TABLET, FILM COATED ORAL NIGHTLY
Qty: 90 TABLET | Refills: 0 | Status: SHIPPED | OUTPATIENT
Start: 2024-10-09 | End: 2025-01-07

## 2024-10-09 RX ORDER — ESOMEPRAZOLE MAGNESIUM 40 MG/1
40 CAPSULE, DELAYED RELEASE ORAL DAILY
Qty: 90 CAPSULE | Refills: 0 | Status: SHIPPED | OUTPATIENT
Start: 2024-10-09 | End: 2025-01-07

## 2024-10-09 RX ORDER — MELOXICAM 15 MG/1
15 TABLET ORAL DAILY
Qty: 30 TABLET | Refills: 11 | Status: SHIPPED | OUTPATIENT
Start: 2024-10-09 | End: 2025-10-09

## 2024-10-09 NOTE — PROGRESS NOTES
Subjective   Patient ID: Ivan Johnson is a 64 y.o. male who presents for Follow-up.    HPI     Pt states he has been dealing with dry and cracked skin on his LT index and middle finger lately. He states he used a triple antibiotic that did not seem to help much.    No other concerns at this time.    Recent BW   PSA: 9/2023  Colonoscopy: 2/2024    Patient has hyperlipidemia.  Patient tries to limit the amount of fatty foods and high cholesterol foods that he consumes  His hyperlipidemia is currently treated with dietary/ lifestyle changes.      He has GERD.  He was treated with Esomeprazole.   He denies any breakthrough reflux symptoms.      Patient has obstructive sleep apnea.  Patient utilizes a CPAP machine on a nightly basis.  He is tolerating the machine well and does feel well rested in the mornings.     He has periodic leg movement disorder.  Symptoms were stable when he was on Requip.     He has chronic lower back pain and hip pain.  This has been effectively treated with Tramadol as needed.  NSAIDs have not provided adequate relief in the past.     He has a history of BPH/urinary frequency/urinary retention.  Symptoms have been well-controlled on the current combination of oxybutynin and Flomax.      He has nonalcoholic fatty liver disease.  Pt underwent laboratory evaluation as well as an ultrasound of the liver.   Patient has not had an actual biopsy performed.      He has vitamin D deficiency.  Patient continues to take over-the-counter vitamin D supplementation of 1000 units daily.     Patient is complaining of dry, cracking skin on his LT index and middle finger lately.  He says this has been going on for 4-6 weeks    OARRS:  Sushant Wiseman DO on 10/9/2024  6:13 PM  I have personally reviewed the OARRS report for Ivan Johnson. I have considered the risks of abuse, dependence, addiction and diversion and I believe that it is clinically appropriate for Ivan Johnson to be prescribed this  medication    Is the patient prescribed a combination of a benzodiazepine and opioid?  No    Last Urine Drug Screen / ordered today: No  Recent Results (from the past 8760 hours)   Confirmation Opiate/Opioid/Benzo Prescription Compliance    Collection Time: 04/03/24  6:57 PM   Result Value Ref Range    Clonazepam <25 <25 ng/mL    7-Aminoclonazepam <25 <25 ng/mL    Alprazolam <25 <25 ng/mL    Alpha-Hydroxyalprazolam <25 <25 ng/mL    Midazolam <25 <25 ng/mL    Alpha-Hydroxymidazolam <25 <25 ng/mL    Chlordiazepoxide <25 <25 ng/mL    Diazepam <25 <25 ng/mL    Nordiazepam <25 <25 ng/mL    Temazepam <25 <25 ng/mL    Oxazepam <25 <25 ng/mL    Lorazepam <25 <25 ng/mL    Methadone <25 <25 ng/mL    EDDP <25 <25 ng/mL    6-Acetylmorphine <25 <25 ng/mL    Codeine <50 <50 ng/mL    Hydrocodone <25 <25 ng/mL    Hydromorphone <25 <25 ng/mL    Morphine  <50 <50 ng/mL    Norhydrocodone <25 <25 ng/mL    Noroxycodone <25 <25 ng/mL    Oxycodone <25 <25 ng/mL    Oxymorphone <25 <25 ng/mL    Fentanyl <2.5 <2.5 ng/mL    Norfentanyl <2.5 <2.5 ng/mL    Tramadol 957 (H) <50 ng/mL    O-Desmethyltramadol >1,000 (H) <50 ng/mL    Zolpidem <25 <25 ng/mL    Zolpidem Metabolite (ZCA) <25 <25 ng/mL     Results are as expected.     Controlled Substance Agreement:  Date of the Last Agreement: 4/3/2024  Reviewed Controlled Substance Agreement including but not limited to the benefits, risks, and alternatives to treatment with a Controlled Substance medication(s).    Opioids:  What is the patient's goal of therapy? Reduce pain as needed  Is this being achieved with current treatment? yes    I have calculated the patient's Morphine Dose Equivalent (MED):   I have considered referral to Pain Management and/or a specialist, and do not feel it is necessary at this time.    I feel that it is clinically indicated to continue this current medication regimen after consideration of alternative therapies, and other non-opioid treatment.    Opioid Risk  "Screening:  Family History of Substance Abuse  Alcohol: 0 (7/3/2024  6:00 PM)  Illegal Drugs: 0 (7/3/2024  6:00 PM)  Prescription Drugs: 0 (7/3/2024  6:00 PM)    Personal History of Substance Abuse  Alcohol: 0 (7/3/2024  6:00 PM)  Illegal Drugs: 0 (7/3/2024  6:00 PM)  Prescription Drugs: 0 (7/3/2024  6:00 PM)    Patient Age (16-45)  Age (16-45): 0 (7/3/2024  6:00 PM)    History of Preadolescent Sexual Abuse  History of Preadolescent Sexual Abuse: .0 (7/3/2024  6:00 PM)    Psychological Disease  Attention Deficit Disorder, Obsessive Compulsive Disorder, Bipolar, Schizophrenia: 0 (7/3/2024  6:00 PM)  Depression: 0 (7/3/2024  6:00 PM)    Total Score  Total Score: 0 (7/3/2024  6:00 PM)    Total Score Risk Category  TOTAL SCORE CATEGORY: Low Risk (0-3) (7/3/2024  6:00 PM)        Pain Assessment:  No data recorded    Review of Systems    Cardiovascular: Patient denies any chest pain, shortness of breath with exertion, tachycardia, palpitations, orthopnea, or paroxysmal nocturnal dyspnea.  Respiratory: Patient denies any cough, shortness breath, or wheezing.  Gastrointestinal: Patient denies any nausea, vomiting, diarrhea, constipation, melena, hematochezia, or reflux symptoms  Skin: Denies any rashes or skin lesions  Neurology: Patient denies any new motor or sensory losses. Denies any numbness, tingling, weakness, and incoordination of the extremities. Patient also denies any tremor, seizures, or gait instability.  Endocrinology: Denies any polyuria, polydipsia, polyphagia, or heat/cold intolerance.  Psychiatric: He denies any depression, anxiety, or suicidal/homicidal ideation.    SEE HPI ALSO      Objective   /75   Pulse 86   Temp 36.2 °C (97.2 °F)   Ht 1.854 m (6' 1\")   Wt 130 kg (287 lb 1.6 oz)   SpO2 95%   BMI 37.88 kg/m²     Physical Exam    General Appearance: Alert and cooperative, in no acute distress, well-developed/well-nourished obese male  Neck: Supple and without adenopathy or rigidity. There is " "no JVD at 90° and no carotid bruits are noted. There is no thyromegaly, thyroid tenderness, or palpable thyroid nodules.  Heart: Regular rate and rhythm without murmur or ectopy.  Lungs: Clear to auscultation bilaterally with good air exchange.  Neurological exam: Alert and oriented ×3, no tremor, normal gait.  Extremities: No clubbing, cyanosis, or edema  Psychiatric: Appropriate mood and affect, good insight and judgment, no delusions or thought disorders, no suicidal or homicidal ideation    SKIN: Examination of the left hand reveals a dry, cracking rash to the second and third fingers. Several small papules/vesicles noted.     Assessment/Plan     LABS ORDERED TO BE DONE PRIOR TO TODAY'S VISIT WERE NOT COMPLETED.    1. Mixed hyperlipidemia  Stable based on labs.  We will continue to monitor.  Dietary changes, exercise, and maintenance of a healthy weight were discussed at length.    2. Type 2 diabetes mellitus without complication, without long-term current use of insulin (Multi)  Last A1c was:  Lab Results   Component Value Date    HGBA1C 6.4 (H) 03/08/2024   Dietary changes, exercise, and maintenance of the healthy weight were encouraged.  Patient was advised to have a diabetic eye exam annually.  Patient was also advised to check the feet on a regular basis.     3. Chronic bilateral low back pain without sciatica  Patient will continue with tramadol for pain as needed.  NSAIDs have been ineffective for his pain in the past.   His pain seems to be worsening over time.  10/4/2023:  We referred him to pain pradip.  4/3/2024:  Pt reports that Pain mgmt started him on meloxicam and gabapentin.  He stopped the gabapentin shortly after starting it as it was giving him \"strange dreams\" that would wake him up several times per night.  This resolved once he stopped the medication.    7/3/2024:  Pt reported that he restarted the gabapentin and has been tolerating it well.    4. Vitamin D deficiency  Stable based on last " vitamin D labs.  Patient to continue vitamin D supplementation.    5. Periodic limb movement  Stable on the current treatment plan.    Patient to continue Requip at current dose.    6. Benign prostatic hyperplasia with urinary retention  Stable based on symptoms.  Patient to continue medications.    7. Urinary retention  Stable.   We will continue with his CPAP therapy.   He continues to benefit from the CPAP therapy and feels refreshed in the mornings.    8. FEROZ (obstructive sleep apnea)  Stable based off symptoms.   Will continue CPAP machine usage.     9. Current smoker  Pt has been encouraged to work on smoking cessation and available smoking cessation aids were discussed.  The importance of smoking cessation was discussed.   Patient understands that continued smoking will increase the risks of lung disease, vascular disease, and multiple malignancies.     10. Gastroesophageal reflux disease without esophagitis  Stable with the current medication and appropriate dietary changes.  Patient to continue PPI therapy.    11.  Dyshidrosis  We will start him on mometasone ointment apply topically once daily for the current symptoms.  Encouraged to avoid exposure to chemicals or .  Can consider referral to dermatology if not improving.       PSA OVERDUE 9/16/2024        Follow up in three months or sooner if needed.      Scribe Attestation  By signing my name below, IAmara Scribe   attest that this documentation has been prepared under the direction and in the presence of Sushant Wiseman DO.    Requested Prescriptions     Signed Prescriptions Disp Refills    rOPINIRole (Requip) 0.5 mg tablet 90 tablet 0     Sig: Take 1 tablet (0.5 mg) by mouth once daily at bedtime.    esomeprazole (NexIUM) 40 mg DR capsule 90 capsule 0     Sig: Take 1 capsule (40 mg) by mouth once daily.    meloxicam (Mobic) 15 mg tablet 30 tablet 11     Sig: Take 1 tablet (15 mg) by mouth once daily.    gabapentin (Neurontin) 300 mg  capsule 60 capsule 11     Sig: Take 1 capsule (300 mg) by mouth 2 times a day. Take 1 to 2 capsules nightly.  Titrate up to 2 capsules nightly after 1 week.    traMADol (Ultram) 50 mg tablet 60 tablet 0     Sig: Take 1-2 tablets ( mg) by mouth every 12 hours if needed for severe pain (7 - 10).    mometasone (Elocon) 0.1 % ointment 15 g 1     Sig: Apply topically once daily.    oxybutynin (Ditropan) 5 mg tablet 180 tablet 0     Sig: Take 1 tablet (5 mg) by mouth 2 times a day.    tamsulosin (Flomax) 0.4 mg 24 hr capsule 90 capsule 0     Sig: Take 1 capsule (0.4 mg) by mouth once daily.

## 2024-10-09 NOTE — PATIENT INSTRUCTIONS
HAVE FASTING LABS SOON AS WE DISCUSSED.    Follow up in 3 months.    It was a pleasure to see you today. Thank you for choosing us for your health care needs.    If you have lab or other testing completed and have not been informed of results within one week, please call the office for your results.    If you haven't done so, consider signing up for [facility], the [facility] personal health record. Ask the staff how you can get started.

## 2024-10-15 ENCOUNTER — TELEPHONE (OUTPATIENT)
Dept: PRIMARY CARE | Facility: CLINIC | Age: 64
End: 2024-10-15
Payer: COMMERCIAL

## 2024-10-15 RX ORDER — OXYBUTYNIN CHLORIDE 5 MG/1
5 TABLET ORAL 2 TIMES DAILY
Qty: 180 TABLET | Refills: 0 | Status: SHIPPED | OUTPATIENT
Start: 2024-10-15 | End: 2025-01-13

## 2024-10-15 RX ORDER — TAMSULOSIN HYDROCHLORIDE 0.4 MG/1
0.4 CAPSULE ORAL
Qty: 90 CAPSULE | Refills: 0 | Status: SHIPPED | OUTPATIENT
Start: 2024-10-15 | End: 2025-01-13

## 2024-10-15 NOTE — TELEPHONE ENCOUNTER
Daina called stating he never got his oxybutyn and his Flomax.  Should he be taking these medications if so can you send to pharmacy [facility].

## 2024-10-17 ENCOUNTER — OFFICE VISIT (OUTPATIENT)
Dept: URGENT CARE | Age: 64
End: 2024-10-17
Payer: COMMERCIAL

## 2024-10-17 VITALS
HEART RATE: 71 BPM | SYSTOLIC BLOOD PRESSURE: 139 MMHG | DIASTOLIC BLOOD PRESSURE: 82 MMHG | HEIGHT: 72 IN | WEIGHT: 280 LBS | OXYGEN SATURATION: 94 % | TEMPERATURE: 97.9 F | RESPIRATION RATE: 20 BRPM | BODY MASS INDEX: 37.93 KG/M2

## 2024-10-17 DIAGNOSIS — K08.89 ODONTALGIA: Primary | ICD-10-CM

## 2024-10-17 DIAGNOSIS — K02.9 DENTAL CARIES INTO PULP: ICD-10-CM

## 2024-10-17 RX ORDER — CLINDAMYCIN HYDROCHLORIDE 300 MG/1
300 CAPSULE ORAL 3 TIMES DAILY
Qty: 21 CAPSULE | Refills: 0 | Status: SHIPPED | OUTPATIENT
Start: 2024-10-17 | End: 2024-10-24

## 2024-10-17 RX ORDER — LIDOCAINE HYDROCHLORIDE 20 MG/ML
SOLUTION OROPHARYNGEAL
Qty: 100 ML | Refills: 0 | Status: SHIPPED | OUTPATIENT
Start: 2024-10-17

## 2024-10-17 ASSESSMENT — PAIN SCALES - GENERAL: PAINLEVEL_OUTOF10: 6

## 2024-10-17 NOTE — PROGRESS NOTES
Subjective   Patient ID: Ivan Johnson is a 64 y.o. male who presents for Other (Right upper toothache x 1 day).  HPI  Patient presents for toothache.  Patient reports 1 day of tooth pain in the right maxillary molar area.  Patient is between dentist.  No trauma or direct blow.  No grossly visible swelling or constitutional signs and symptoms otherwise.  No other complaints.    Review of Systems    Constitutional:  See HPI   ENT: See HPI  Neurologic:  Alert and oriented X4, No numbness, No tingling.    All other systems are negative     Objective     /82 (BP Location: Left arm, Patient Position: Sitting)   Pulse 71   Temp 36.6 °C (97.9 °F) (Oral)   Resp 20   Ht 1.829 m (6')   Wt 127 kg (280 lb)   SpO2 94%   BMI 37.97 kg/m²     Physical Exam    General:  Alert and oriented, No acute distress.    Eye:  Pupils are equal, round and reactive to light, Normal conjunctiva.    HENT:  Normocephalic, right maxillary premolar and molars with extensive dental work noted; there are dental caries extending and exposing the pulp and both teeth; no palpable or visible swelling  Neck:  Supple    Respiratory: Respirations are non-labored   Musculoskeletal: Normal ROM and strength  Integumentary:  Warm, Dry, Intact, No pallor, No rash.    Neurologic:  Alert, Oriented, Normal sensory, Cranial Nerves II-XII are grossly intact  Psychiatric:  Cooperative, Appropriate mood & affect.    Assessment/Plan   Exam is consistent with dental caries.  Prescriptions for clindamycin and lidocaine viscous.  Patient advised of the dangers of taking clindamycin.  Recommend taking yogurt or probiotics to help avoid C. difficile.  Patient has had clindamycin in the past for the same kind of issue as he is allergic to penicillin.  Recommend close follow-up with dentist.  Patient's clinical presentation is otherwise unremarkable at this time. Patient is discharged with instructions to follow-up with primary care or seek emergency medical  attention for worsening symptoms or any new concerns.  Problem List Items Addressed This Visit    None  Visit Diagnoses       Odontalgia    -  Primary    Relevant Medications    clindamycin (Cleocin) 300 mg capsule    lidocaine (Lidocaine Viscous) 2 % solution    Dental caries into pulp        Relevant Medications    clindamycin (Cleocin) 300 mg capsule    lidocaine (Lidocaine Viscous) 2 % solution            Final diagnoses:   [K08.89] Odontalgia   [K02.9] Dental caries into pulp

## 2024-10-26 ENCOUNTER — LAB (OUTPATIENT)
Dept: LAB | Facility: LAB | Age: 64
End: 2024-10-26
Payer: COMMERCIAL

## 2024-10-26 DIAGNOSIS — E55.9 VITAMIN D DEFICIENCY: ICD-10-CM

## 2024-10-26 DIAGNOSIS — Z12.5 PROSTATE CANCER SCREENING: ICD-10-CM

## 2024-10-26 DIAGNOSIS — E11.9 TYPE 2 DIABETES MELLITUS WITHOUT COMPLICATION, WITHOUT LONG-TERM CURRENT USE OF INSULIN (MULTI): ICD-10-CM

## 2024-10-26 LAB
25(OH)D3 SERPL-MCNC: 23 NG/ML (ref 30–100)
ANION GAP SERPL CALC-SCNC: 12 MMOL/L (ref 10–20)
BUN SERPL-MCNC: 16 MG/DL (ref 6–23)
CALCIUM SERPL-MCNC: 9.1 MG/DL (ref 8.6–10.3)
CHLORIDE SERPL-SCNC: 105 MMOL/L (ref 98–107)
CO2 SERPL-SCNC: 26 MMOL/L (ref 21–32)
CREAT SERPL-MCNC: 0.87 MG/DL (ref 0.5–1.3)
EGFRCR SERPLBLD CKD-EPI 2021: >90 ML/MIN/1.73M*2
EST. AVERAGE GLUCOSE BLD GHB EST-MCNC: 140 MG/DL
GLUCOSE SERPL-MCNC: 111 MG/DL (ref 74–99)
HBA1C MFR BLD: 6.5 %
POTASSIUM SERPL-SCNC: 4.2 MMOL/L (ref 3.5–5.3)
PSA SERPL-MCNC: 1.09 NG/ML
SODIUM SERPL-SCNC: 139 MMOL/L (ref 136–145)

## 2024-10-26 PROCEDURE — 83036 HEMOGLOBIN GLYCOSYLATED A1C: CPT

## 2024-10-26 PROCEDURE — 80048 BASIC METABOLIC PNL TOTAL CA: CPT

## 2024-10-26 PROCEDURE — 82306 VITAMIN D 25 HYDROXY: CPT

## 2024-10-26 PROCEDURE — G0103 PSA SCREENING: HCPCS

## 2024-10-26 PROCEDURE — 36415 COLL VENOUS BLD VENIPUNCTURE: CPT

## 2024-11-08 DIAGNOSIS — G89.29 CHRONIC BILATERAL LOW BACK PAIN WITHOUT SCIATICA: ICD-10-CM

## 2024-11-08 DIAGNOSIS — M54.50 CHRONIC BILATERAL LOW BACK PAIN WITHOUT SCIATICA: ICD-10-CM

## 2024-11-12 RX ORDER — TRAMADOL HYDROCHLORIDE 50 MG/1
50-100 TABLET ORAL EVERY 12 HOURS PRN
Qty: 28 TABLET | Refills: 0 | Status: SHIPPED | OUTPATIENT
Start: 2024-11-12 | End: 2024-11-19

## 2024-11-25 DIAGNOSIS — G89.29 CHRONIC BILATERAL LOW BACK PAIN WITHOUT SCIATICA: ICD-10-CM

## 2024-11-25 DIAGNOSIS — M54.50 CHRONIC BILATERAL LOW BACK PAIN WITHOUT SCIATICA: ICD-10-CM

## 2024-11-25 NOTE — TELEPHONE ENCOUNTER
Pt states he only got a 28 day quantity when he was supposed to get 60 due to the provider being out at the time of refill, please advise. Pt is requesting the full 60 days

## 2024-11-26 RX ORDER — TRAMADOL HYDROCHLORIDE 50 MG/1
50-100 TABLET ORAL EVERY 12 HOURS PRN
Qty: 60 TABLET | Refills: 0 | Status: SHIPPED | OUTPATIENT
Start: 2024-11-26

## 2024-12-09 DIAGNOSIS — G89.29 CHRONIC BILATERAL LOW BACK PAIN WITHOUT SCIATICA: ICD-10-CM

## 2024-12-09 DIAGNOSIS — M54.50 CHRONIC BILATERAL LOW BACK PAIN WITHOUT SCIATICA: ICD-10-CM

## 2024-12-10 RX ORDER — TRAMADOL HYDROCHLORIDE 50 MG/1
50-100 TABLET ORAL EVERY 12 HOURS PRN
Qty: 60 TABLET | Refills: 0 | Status: SHIPPED | OUTPATIENT
Start: 2024-12-10

## 2024-12-10 NOTE — TELEPHONE ENCOUNTER
Pt stated he was only given half of the prescription instead of the 60 tablet quantity he usually gets. He says he has enough for tomorrow but that he'll be out of the medication after that

## 2024-12-21 ENCOUNTER — OFFICE VISIT (OUTPATIENT)
Dept: URGENT CARE | Age: 64
End: 2024-12-21
Payer: COMMERCIAL

## 2024-12-21 VITALS
DIASTOLIC BLOOD PRESSURE: 75 MMHG | TEMPERATURE: 98 F | HEIGHT: 73 IN | BODY MASS INDEX: 37.77 KG/M2 | OXYGEN SATURATION: 95 % | HEART RATE: 80 BPM | SYSTOLIC BLOOD PRESSURE: 147 MMHG | WEIGHT: 285 LBS | RESPIRATION RATE: 20 BRPM

## 2024-12-21 DIAGNOSIS — R21 RASH AND OTHER NONSPECIFIC SKIN ERUPTION: Primary | ICD-10-CM

## 2024-12-21 RX ORDER — SULFAMETHOXAZOLE AND TRIMETHOPRIM 800; 160 MG/1; MG/1
1 TABLET ORAL 2 TIMES DAILY
Qty: 14 TABLET | Refills: 0 | Status: SHIPPED | OUTPATIENT
Start: 2024-12-21 | End: 2024-12-28

## 2024-12-21 RX ORDER — CLOTRIMAZOLE AND BETAMETHASONE DIPROPIONATE 10; .64 MG/G; MG/G
1 CREAM TOPICAL 2 TIMES DAILY
Qty: 45 G | Refills: 0 | Status: SHIPPED | OUTPATIENT
Start: 2024-12-21 | End: 2024-12-31

## 2024-12-21 RX ORDER — PREDNISONE 5 MG/1
TABLET ORAL
Qty: 30 TABLET | Refills: 0 | Status: SHIPPED | OUTPATIENT
Start: 2024-12-21 | End: 2024-12-31

## 2024-12-21 ASSESSMENT — ENCOUNTER SYMPTOMS
MUSCULOSKELETAL NEGATIVE: 1
ENDOCRINE NEGATIVE: 1
ALLERGIC/IMMUNOLOGIC NEGATIVE: 1
EYES NEGATIVE: 1
RESPIRATORY NEGATIVE: 1
GASTROINTESTINAL NEGATIVE: 1
CONSTITUTIONAL NEGATIVE: 1
PALPITATIONS: 0
NEUROLOGICAL NEGATIVE: 1
PSYCHIATRIC NEGATIVE: 1
HEMATOLOGIC/LYMPHATIC NEGATIVE: 1

## 2024-12-21 ASSESSMENT — PAIN SCALES - GENERAL: PAINLEVEL_OUTOF10: 4

## 2024-12-21 NOTE — PROGRESS NOTES
Subjective   Patient ID: Ivan Johnson is a 64 y.o. male. They present today with a chief complaint of Other (Bilateral hands dry and cracking x 6 weeks).    History of Present Illness  HPI    Pt presents to urgent care with c/o   rash to bilateral hands for the past 6 weeks.  Patient states he was seen by his PCP and given topical steroid cream which does not seem to be helping.  Patient reports skin cracking around his nails as well as itching, pain, raised bumps to the tops of his hands.  States has been using over-the-counter Neosporin.  Which does seem to help.  Reports today both of his hands feel tight and swollen to him and he is concerned for infection. .  Pt denies CP, SOB, palpitations, fevers, abd pain, n/v/d, sick contacts, recent travel.        Past Medical History  Allergies as of 12/21/2024 - Reviewed 12/21/2024   Allergen Reaction Noted    Penicillins Unknown 04/04/2023    Varenicline Nausea Only 04/04/2023       (Not in a hospital admission)       Past Medical History:   Diagnosis Date    Benign prostatic hyperplasia without lower urinary tract symptoms 01/04/2023    Benign prostate hyperplasia    Dorsalgia, unspecified 01/04/2023    Chronic back pain    Gastro-esophageal reflux disease without esophagitis 01/04/2023    GERD (gastroesophageal reflux disease)    Male erectile dysfunction, unspecified 08/31/2015    Erectile dysfunction    Periodic limb movement disorder 01/04/2023    Periodic limb movement    Personal history of other diseases of the musculoskeletal system and connective tissue 03/17/2016    History of osteonecrosis    Retention of urine, unspecified 01/04/2023    Urinary retention    Sleep apnea, unspecified 09/14/2020    Apnea, sleep       Past Surgical History:   Procedure Laterality Date    COLONOSCOPY  09/01/2015    Complete Colonoscopy    HERNIA REPAIR  08/31/2015    Inguinal Hernia Repair    OTHER SURGICAL HISTORY  08/31/2015    Orchiectomy Left    TOTAL HIP ARTHROPLASTY   "09/01/2015    Hip Replacement        reports that he has been smoking cigarettes. He has never used smokeless tobacco. He reports that he does not drink alcohol and does not use drugs.    Review of Systems  Review of Systems   Constitutional: Negative.    HENT: Negative.     Eyes: Negative.    Respiratory: Negative.     Cardiovascular:  Negative for chest pain and palpitations.   Gastrointestinal: Negative.    Endocrine: Negative.    Genitourinary: Negative.    Musculoskeletal: Negative.    Skin: Negative.    Allergic/Immunologic: Negative.    Neurological: Negative.    Hematological: Negative.    Psychiatric/Behavioral: Negative.     All other systems reviewed and are negative.                                 Objective    Vitals:    12/21/24 1328   BP: 147/75   BP Location: Left arm   Patient Position: Sitting   Pulse: 80   Resp: 20   Temp: 36.7 °C (98 °F)   TempSrc: Oral   SpO2: 95%   Weight: 129 kg (285 lb)   Height: 1.854 m (6' 1\")     No LMP for male patient.    Physical Exam  Vitals and nursing note reviewed.   Constitutional:       General: He is not in acute distress.     Appearance: Normal appearance. He is not ill-appearing or toxic-appearing.   HENT:      Head: Atraumatic.      Right Ear: Tympanic membrane, ear canal and external ear normal.      Left Ear: Tympanic membrane, ear canal and external ear normal.      Nose: Nose normal.      Mouth/Throat:      Mouth: Mucous membranes are moist.      Pharynx: Oropharynx is clear. No oropharyngeal exudate or posterior oropharyngeal erythema.   Eyes:      Extraocular Movements: Extraocular movements intact.      Conjunctiva/sclera: Conjunctivae normal.      Pupils: Pupils are equal, round, and reactive to light.   Cardiovascular:      Rate and Rhythm: Normal rate and regular rhythm.      Pulses: Normal pulses.      Heart sounds: Normal heart sounds.   Pulmonary:      Effort: Pulmonary effort is normal.      Breath sounds: Normal breath sounds.   Abdominal:      " General: Abdomen is flat. Bowel sounds are normal.      Palpations: Abdomen is soft.      Tenderness: There is no abdominal tenderness.   Musculoskeletal:         General: Normal range of motion.      Cervical back: Normal range of motion and neck supple. No tenderness.   Skin:     General: Skin is warm and dry.      Capillary Refill: Capillary refill takes less than 2 seconds.   Neurological:      General: No focal deficit present.      Mental Status: He is alert and oriented to person, place, and time.   Psychiatric:         Mood and Affect: Mood normal.         Behavior: Behavior normal.         Thought Content: Thought content normal.         Procedures    Point of Care Test & Imaging Results from this visit  No results found for this visit on 12/21/24.   No results found.    Diagnostic study results (if any) were reviewed by FRED Collier.    Assessment/Plan   Allergies, medications, history, and pertinent labs/EKGs/Imaging reviewed by FRED Collier.     Medical Decision Making    64-year-old male presents urgent care with rash to bilateral hands x 6 weeks with no improvement on topical steroid cream.  Rash does not appear consistent with scabies.  Patient noted to have several circular lesions  to top of hand concerning for possible fungal infection.  Patient admits he wears gloves often at work.  Several areas also appear and swollen concerning for superficial cellulitis.  At this time we will start patient Bactrim, and topical and oral prednisone taper.  Patient advised to follow-up with PCP or dermatology.    Orders and Diagnoses  Diagnoses and all orders for this visit:  Rash and other nonspecific skin eruption  -     predniSONE (Deltasone) 5 mg tablet; Take 5 tabs (25 mg) by mouth daily for 2 days, then 4 tabs (20 mg) daily for 2 days, then 3 tabs (15 mg) daily for 2 days, then 2 tabs (10 mg) daily for 2 days, then 1 tab (5 mg) daily for 2 days.  -     sulfamethoxazole-trimethoprim  (Bactrim DS) 800-160 mg tablet; Take 1 tablet by mouth 2 times a day for 7 days.  -     clotrimazole-betamethasone (Lotrisone) cream; Apply 1 Application topically 2 times a day for 10 days.      Medical Admin Record      Patient disposition: Home    Electronically signed by FRED Collier  3:48 PM

## 2025-01-13 ENCOUNTER — APPOINTMENT (OUTPATIENT)
Dept: PRIMARY CARE | Facility: CLINIC | Age: 65
End: 2025-01-13
Payer: COMMERCIAL

## 2025-01-13 VITALS
SYSTOLIC BLOOD PRESSURE: 128 MMHG | TEMPERATURE: 98.2 F | WEIGHT: 298.5 LBS | DIASTOLIC BLOOD PRESSURE: 76 MMHG | BODY MASS INDEX: 39.56 KG/M2 | HEART RATE: 76 BPM | HEIGHT: 73 IN | OXYGEN SATURATION: 93 %

## 2025-01-13 DIAGNOSIS — E78.2 MIXED HYPERLIPIDEMIA: Primary | ICD-10-CM

## 2025-01-13 DIAGNOSIS — G47.61 PERIODIC LIMB MOVEMENT: ICD-10-CM

## 2025-01-13 DIAGNOSIS — E66.01 CLASS 2 SEVERE OBESITY WITH SERIOUS COMORBIDITY AND BODY MASS INDEX (BMI) OF 39.0 TO 39.9 IN ADULT, UNSPECIFIED OBESITY TYPE: ICD-10-CM

## 2025-01-13 DIAGNOSIS — E11.9 TYPE 2 DIABETES MELLITUS WITHOUT COMPLICATION, WITHOUT LONG-TERM CURRENT USE OF INSULIN: ICD-10-CM

## 2025-01-13 DIAGNOSIS — K21.9 GASTROESOPHAGEAL REFLUX DISEASE WITHOUT ESOPHAGITIS: ICD-10-CM

## 2025-01-13 DIAGNOSIS — R33.9 URINARY RETENTION: ICD-10-CM

## 2025-01-13 DIAGNOSIS — F17.200 CURRENT SMOKER: ICD-10-CM

## 2025-01-13 DIAGNOSIS — N40.1 BENIGN PROSTATIC HYPERPLASIA WITH URINARY RETENTION: ICD-10-CM

## 2025-01-13 DIAGNOSIS — R33.8 BENIGN PROSTATIC HYPERPLASIA WITH URINARY RETENTION: ICD-10-CM

## 2025-01-13 DIAGNOSIS — M54.50 CHRONIC BILATERAL LOW BACK PAIN WITHOUT SCIATICA: ICD-10-CM

## 2025-01-13 DIAGNOSIS — R21 RASH OF BOTH HANDS: ICD-10-CM

## 2025-01-13 DIAGNOSIS — E66.812 CLASS 2 SEVERE OBESITY WITH SERIOUS COMORBIDITY AND BODY MASS INDEX (BMI) OF 39.0 TO 39.9 IN ADULT, UNSPECIFIED OBESITY TYPE: ICD-10-CM

## 2025-01-13 DIAGNOSIS — E55.9 VITAMIN D DEFICIENCY: ICD-10-CM

## 2025-01-13 DIAGNOSIS — G47.33 OSA (OBSTRUCTIVE SLEEP APNEA): ICD-10-CM

## 2025-01-13 DIAGNOSIS — M25.552 LEFT HIP PAIN: ICD-10-CM

## 2025-01-13 DIAGNOSIS — G89.29 CHRONIC BILATERAL LOW BACK PAIN WITHOUT SCIATICA: ICD-10-CM

## 2025-01-13 DIAGNOSIS — M25.562 LEFT KNEE PAIN, UNSPECIFIED CHRONICITY: ICD-10-CM

## 2025-01-13 PROCEDURE — 3078F DIAST BP <80 MM HG: CPT | Performed by: FAMILY MEDICINE

## 2025-01-13 PROCEDURE — 3049F LDL-C 100-129 MG/DL: CPT | Performed by: FAMILY MEDICINE

## 2025-01-13 PROCEDURE — 99214 OFFICE O/P EST MOD 30 MIN: CPT | Performed by: FAMILY MEDICINE

## 2025-01-13 PROCEDURE — 3008F BODY MASS INDEX DOCD: CPT | Performed by: FAMILY MEDICINE

## 2025-01-13 PROCEDURE — 3074F SYST BP LT 130 MM HG: CPT | Performed by: FAMILY MEDICINE

## 2025-01-13 RX ORDER — ESOMEPRAZOLE MAGNESIUM 40 MG/1
40 CAPSULE, DELAYED RELEASE ORAL DAILY
Qty: 90 CAPSULE | Refills: 0 | Status: SHIPPED | OUTPATIENT
Start: 2025-01-13 | End: 2025-02-14 | Stop reason: SDUPTHER

## 2025-01-13 RX ORDER — OXYBUTYNIN CHLORIDE 5 MG/1
5 TABLET ORAL 2 TIMES DAILY
Qty: 180 TABLET | Refills: 0 | Status: SHIPPED | OUTPATIENT
Start: 2025-01-13 | End: 2025-04-13

## 2025-01-13 RX ORDER — CLOTRIMAZOLE AND BETAMETHASONE DIPROPIONATE 10; .64 MG/G; MG/G
1 CREAM TOPICAL 2 TIMES DAILY
COMMUNITY

## 2025-01-13 RX ORDER — ERYTHROMYCIN 500 MG/1
TABLET, COATED ORAL
COMMUNITY
Start: 2024-10-22

## 2025-01-13 RX ORDER — MELOXICAM 15 MG/1
15 TABLET ORAL DAILY
Qty: 30 TABLET | Refills: 11 | Status: SHIPPED | OUTPATIENT
Start: 2025-01-13 | End: 2026-01-13

## 2025-01-13 RX ORDER — GABAPENTIN 300 MG/1
300 CAPSULE ORAL 2 TIMES DAILY
Qty: 60 CAPSULE | Refills: 11 | Status: SHIPPED | OUTPATIENT
Start: 2025-01-13

## 2025-01-13 RX ORDER — TRAMADOL HYDROCHLORIDE 50 MG/1
50-100 TABLET ORAL EVERY 12 HOURS PRN
Qty: 60 TABLET | Refills: 0 | Status: SHIPPED | OUTPATIENT
Start: 2025-01-13 | End: 2025-02-14 | Stop reason: SDUPTHER

## 2025-01-13 RX ORDER — ROPINIROLE 0.5 MG/1
0.5 TABLET, FILM COATED ORAL NIGHTLY
Qty: 90 TABLET | Refills: 0 | Status: SHIPPED | OUTPATIENT
Start: 2025-01-13 | End: 2025-04-13

## 2025-01-13 RX ORDER — TAMSULOSIN HYDROCHLORIDE 0.4 MG/1
0.4 CAPSULE ORAL
Qty: 90 CAPSULE | Refills: 0 | Status: SHIPPED | OUTPATIENT
Start: 2025-01-13 | End: 2025-04-13

## 2025-01-13 NOTE — PROGRESS NOTES
Subjective   Patient ID: Ivan Johnson is a 64 y.o. male who presents for Follow-up.    HPI     Pt states both of his hands have not healed yet.   He says he was using an antibiotic ointment which helped, but says when he stops using it, his hands start cracking.  He stopped it for two days and the cracking starts up again.   He was treated with topical mometasone ointment for suspected dyshidrosis at his [10/9/2024] visit.  He was seen at a local urgent care [12/21/2024].   He was started on a tapering course of prednisone, oral Bactrim DS and topical Lotrisone cream.  Patient states the gloves provided at work cause him more dryness.   He has his own personal gloves available at work and has been using them for about 3 years.   He washes his hands about 30-40x a day at work.   Patient reports that he comes into contact with coolant at his job.     Pt states that he has been having left hip and knee pain.   Pt says he has been having issues standing on it and walking.   He says this started a few weeks ago.  He reports pain in his LT knee and hip.   He a left hip replacement about 22 years ago.      He also complains of chronic pain and limited ROM in his right shoulder. Has had a steroid injection in the past that was helpful.    Patient reports family history of diabetes (uncles, brother, son, mother).     He reports that he has been debating trying to get approved for disability as he is struggling to continue working.  Patient reports that he previously underwent a functional capacity test for work prior to being hired.     Patient has hyperlipidemia.  Patient tries to limit the amount of fatty foods and high cholesterol foods that he consumes  His hyperlipidemia is currently treated with dietary/ lifestyle changes.      He has GERD.  He was treated with Esomeprazole.   He denies any breakthrough reflux symptoms.      Patient has obstructive sleep apnea.  Patient utilizes a CPAP machine on a nightly basis.  He  is tolerating the machine well and does feel well rested in the mornings.     He has periodic leg movement disorder.  Symptoms were stable when he was on Requip.     He has chronic lower back pain and hip pain.  This has been effectively treated with Tramadol as needed.  NSAIDs have not provided adequate relief in the past.     He has a history of BPH/urinary frequency/urinary retention.  Symptoms have been well-controlled on the current combination of oxybutynin and Flomax.      He has nonalcoholic fatty liver disease.  Pt underwent laboratory evaluation as well as an ultrasound of the liver.   Patient has not had an actual biopsy performed.      He has vitamin D deficiency.  Patient continues to take over-the-counter vitamin D supplementation of 1000 units daily.       OARRS:  Sushant Wiseman DO on 1/13/2025  6:42 PM  I have personally reviewed the OARRS report for Ivan Johnson. I have considered the risks of abuse, dependence, addiction and diversion and I believe that it is clinically appropriate for Ivan Johnson to be prescribed this medication    Is the patient prescribed a combination of a benzodiazepine and opioid?  No    Last Urine Drug Screen / ordered today: No  Recent Results (from the past 8760 hours)   Confirmation Opiate/Opioid/Benzo Prescription Compliance    Collection Time: 04/03/24  6:57 PM   Result Value Ref Range    Clonazepam <25 <25 ng/mL    7-Aminoclonazepam <25 <25 ng/mL    Alprazolam <25 <25 ng/mL    Alpha-Hydroxyalprazolam <25 <25 ng/mL    Midazolam <25 <25 ng/mL    Alpha-Hydroxymidazolam <25 <25 ng/mL    Chlordiazepoxide <25 <25 ng/mL    Diazepam <25 <25 ng/mL    Nordiazepam <25 <25 ng/mL    Temazepam <25 <25 ng/mL    Oxazepam <25 <25 ng/mL    Lorazepam <25 <25 ng/mL    Methadone <25 <25 ng/mL    EDDP <25 <25 ng/mL    6-Acetylmorphine <25 <25 ng/mL    Codeine <50 <50 ng/mL    Hydrocodone <25 <25 ng/mL    Hydromorphone <25 <25 ng/mL    Morphine  <50 <50 ng/mL    Norhydrocodone <25 <25  ng/mL    Noroxycodone <25 <25 ng/mL    Oxycodone <25 <25 ng/mL    Oxymorphone <25 <25 ng/mL    Fentanyl <2.5 <2.5 ng/mL    Norfentanyl <2.5 <2.5 ng/mL    Tramadol 957 (H) <50 ng/mL    O-Desmethyltramadol >1,000 (H) <50 ng/mL    Zolpidem <25 <25 ng/mL    Zolpidem Metabolite (ZCA) <25 <25 ng/mL     Results are as expected.         Controlled Substance Agreement:  Date of the Last Agreement: 4/3/2024  Reviewed Controlled Substance Agreement including but not limited to the benefits, risks, and alternatives to treatment with a Controlled Substance medication(s).    Opioids:  What is the patient's goal of therapy? Reduce pain as needed  Is this being achieved with current treatment? yes    I have calculated the patient's Morphine Dose Equivalent (MED):   I have considered referral to Pain Management and/or a specialist, and do not feel it is necessary at this time.    I feel that it is clinically indicated to continue this current medication regimen after consideration of alternative therapies, and other non-opioid treatment.    Opioid Risk Screening:  Family History of Substance Abuse  Alcohol: 0 (7/3/2024  6:00 PM)  Illegal Drugs: 0 (7/3/2024  6:00 PM)  Prescription Drugs: 0 (7/3/2024  6:00 PM)    Personal History of Substance Abuse  Alcohol: 0 (7/3/2024  6:00 PM)  Illegal Drugs: 0 (7/3/2024  6:00 PM)  Prescription Drugs: 0 (7/3/2024  6:00 PM)    Patient Age (16-45)  Age (16-45): 0 (7/3/2024  6:00 PM)    History of Preadolescent Sexual Abuse  History of Preadolescent Sexual Abuse: .0 (7/3/2024  6:00 PM)    Psychological Disease  Attention Deficit Disorder, Obsessive Compulsive Disorder, Bipolar, Schizophrenia: 0 (7/3/2024  6:00 PM)  Depression: 0 (7/3/2024  6:00 PM)    Total Score  Total Score: 0 (7/3/2024  6:00 PM)    Total Score Risk Category  TOTAL SCORE CATEGORY: Low Risk (0-3) (7/3/2024  6:00 PM)        Pain Assessment:  Analgesia  What was your pain level on average during the past week?: 4  What was your pain  "level at its worst during the past week?: 7  What percentage of your pain has been relieved during the past week?: 35 %  Is the amount of pain relief you are now obtaining from your current pain relievers enough to make a real difference in your life?: Y  Query to Clinician: Is the patient's pain relief clinically significant?: Yes    Activities of Daily Living  Physical Functioning: Worse (Having increased left hip and left knee pain)  Family Relationships: Same  Social Relationships: Same  Mood: Same  Sleep Patterns: Same  Overall Functioning: Worse    Adverse Events  Is patient experiencing any side effects from current pain relievers?: N  Patient's Overall Severity of Side Effects: None      Assessment  Is your overall impression that this patient is benefiting from opioid therapy?: Yes  Specific Analgesic Plan: Continue present regimen              Review of Systems    Cardiovascular: Patient denies any chest pain, shortness of breath with exertion, tachycardia, palpitations, orthopnea, or paroxysmal nocturnal dyspnea.  Respiratory: Patient denies any cough, shortness breath, or wheezing.  Gastrointestinal: Patient denies any nausea, vomiting, diarrhea, constipation, melena, hematochezia, or reflux symptoms    Skin: Denies any rashes or skin lesions. Confirms dry, cracking skin. See HPI.     Neurology: Patient denies any new motor or sensory losses. Denies any numbness, tingling, weakness, and incoordination of the extremities. Patient also denies any tremor, seizures, or gait instability.  Endocrinology: Denies any polyuria, polydipsia, polyphagia, or heat/cold intolerance.  Psychiatric: He denies any depression, anxiety, or suicidal/homicidal ideation.    Objective   /76   Pulse 76   Temp 36.8 °C (98.2 °F)   Ht 1.854 m (6' 1\")   Wt 135 kg (298 lb 8 oz)   SpO2 93%   BMI 39.38 kg/m²     Physical Exam    General Appearance: Alert and cooperative, in no acute distress, well-developed/well-nourished " obese male.   Neck: Supple and without adenopathy or rigidity. There is no JVD at 90° and no carotid bruits are noted. There is no thyromegaly, thyroid tenderness, or palpable thyroid nodules.  Heart: Regular rate and rhythm without murmur or ectopy.  Lungs: Clear to auscultation bilaterally with good air exchange.    Skin: Good turgor, moist, warm and without rashes or lesions.  Both hands are notably dry and cracking around the nail.     Neurological exam: Alert and oriented ×3, no tremor, normal gait.  Extremities: No clubbing, cyanosis, or edema  Psychiatric: Appropriate mood and affect, good insight and judgment, no delusions or thought disorders, no suicidal or homicidal ideation    Assessment/Plan     Hyperlipidemia:   Patient will continue with the current medication.    Dietary changes, exercise, and maintenance of healthy weight were discussed at length.  - Comprehensive Metabolic Panel; Future  - Lipid Panel; Future    Diabetes mellitus type 2 without complication and without long-term use of insulin:  Lab Results   Component Value Date    HGBA1C 6.5 (H) 10/26/2024   A1c was elevated above normal at his last labs.  We will continue with conservative treatment.  Dietary changes, exercise, and maintenance of the healthy weight were encouraged.  Patient was advised to have a diabetic eye exam annually.  Patient was also advised to check the feet on a regular basis.  - Hemoglobin A1C; Future  - Comprehensive Metabolic Panel; Future  - Albumin-Creatinine Ratio, Urine Random; Future    Chronic bilateral lower back pain without sciatica:  Stable based on symptoms.  NSAIDs have been ineffective for him in the past.  Pain is slowly progressive over years of time.  Managed with a combination of gabapentin and as needed tramadol.  - traMADol (Ultram) 50 mg tablet; Take 1-2 tablets ( mg) by mouth every 12 hours if needed for severe pain (7 - 10).  Dispense: 60 tablet; Refill: 0  - meloxicam (Mobic) 15 mg  tablet; Take 1 tablet (15 mg) by mouth once daily.  Dispense: 30 tablet; Refill: 11  - gabapentin (Neurontin) 300 mg capsule; Take 1 capsule (300 mg) by mouth 2 times a day. Take 1 to 2 capsules nightly.  Titrate up to 2 capsules nightly after 1 week.  Dispense: 60 capsule; Refill: 11    Vitamin D deficiency:  Stable based on last labs.  Continue with current dose of vitamin D supplementation.    Periodic limb movement disorder:  Stable based on symptoms.  Continue ropinirole at the current dose.  - rOPINIRole (Requip) 0.5 mg tablet; Take 1 tablet (0.5 mg) by mouth once daily at bedtime.  Dispense: 90 tablet; Refill: 0    Benign prostatic hypertrophy with urinary retention:  Stable based on symptoms.  Will continue the current treatment plan.  - tamsulosin (Flomax) 0.4 mg 24 hr capsule; Take 1 capsule (0.4 mg) by mouth once daily.  Dispense: 90 capsule; Refill: 0  - oxybutynin (Ditropan) 5 mg tablet; Take 1 tablet (5 mg) by mouth 2 times a day.  Dispense: 180 tablet; Refill: 0    Urinary retention:  Stable based on symptoms.  Current treatment plan.    Obstructive sleep apnea:  Stable based on symptoms.  We will continue the CPAP therapy.  Patient utilizes a CPAP for more than 4 hours per night and continues to benefit from use.  He does feel refreshed in the mornings.    Current smoker:  Patient understands that continued smoking will increase the risks of lung disease, vascular disease, and multiple malignancies.  Pt. has been encouraged to work on smoking cessation and available smoking cessation aids were discussed.    GERD:  Stable based on symptoms.  Continue the current medication and appropriate dietary changes.  - esomeprazole (NexIUM) 40 mg DR capsule; Take 1 capsule (40 mg) by mouth once daily.  Dispense: 90 capsule; Refill: 0    Obesity:   Dietary changes, exercise, and maintenance of a healthy weight were discussed at length.  Goal is to achieve a BMI less than 25.    Rash of both hands  - Referral to  Dermatology    Left knee pain, unspecified chronicity  - Referral to Orthopaedic Surgery; Future    Left hip pain  - Referral to Orthopaedic Surgery; Future      Follow up in three months with labs to be done prior to visit.      Scribe Attestation  By signing my name below, I Amara Allen Carbajal   attest that this documentation has been prepared under the direction and in the presence of Sushant Wiseman DO.  Requested Prescriptions     Signed Prescriptions Disp Refills    traMADol (Ultram) 50 mg tablet 60 tablet 0     Sig: Take 1-2 tablets ( mg) by mouth every 12 hours if needed for severe pain (7 - 10).    meloxicam (Mobic) 15 mg tablet 30 tablet 11     Sig: Take 1 tablet (15 mg) by mouth once daily.    gabapentin (Neurontin) 300 mg capsule 60 capsule 11     Sig: Take 1 capsule (300 mg) by mouth 2 times a day. Take 1 to 2 capsules nightly.  Titrate up to 2 capsules nightly after 1 week.    tamsulosin (Flomax) 0.4 mg 24 hr capsule 90 capsule 0     Sig: Take 1 capsule (0.4 mg) by mouth once daily.    oxybutynin (Ditropan) 5 mg tablet 180 tablet 0     Sig: Take 1 tablet (5 mg) by mouth 2 times a day.    rOPINIRole (Requip) 0.5 mg tablet 90 tablet 0     Sig: Take 1 tablet (0.5 mg) by mouth once daily at bedtime.    esomeprazole (NexIUM) 40 mg DR capsule 90 capsule 0     Sig: Take 1 capsule (40 mg) by mouth once daily.     Orders Placed This Encounter   Procedures    Hemoglobin A1C    Comprehensive Metabolic Panel    Lipid Panel    Albumin-Creatinine Ratio, Urine Random    Referral to Dermatology    Referral to Orthopaedic Surgery

## 2025-01-13 NOTE — PATIENT INSTRUCTIONS
[facility]  [address] [address] [address]  [phone]      Schedule with dermatology for the hand rash.      Follow up in 3 months with labs to be done PRIOR.    It was a pleasure to see you today. Thank you for choosing us for your health care needs.    If you have lab or other testing completed and have not been informed of results within one week, please call the office for your results.    If you haven't done so, consider signing up for [facility], the [facility] personal health record. Ask the staff how you can get started.

## 2025-01-25 ENCOUNTER — LAB (OUTPATIENT)
Dept: LAB | Facility: LAB | Age: 65
End: 2025-01-25
Payer: COMMERCIAL

## 2025-01-25 DIAGNOSIS — Z79.899 OTHER LONG TERM (CURRENT) DRUG THERAPY: Primary | ICD-10-CM

## 2025-01-25 LAB
ALBUMIN SERPL BCP-MCNC: 4.4 G/DL (ref 3.4–5)
ALP SERPL-CCNC: 91 U/L (ref 33–136)
ALT SERPL W P-5'-P-CCNC: 39 U/L (ref 10–52)
ANION GAP SERPL CALC-SCNC: 11 MMOL/L
AST SERPL W P-5'-P-CCNC: 25 U/L (ref 9–39)
BASOPHILS # BLD AUTO: 0.04 X10*3/UL (ref 0–0.1)
BASOPHILS NFR BLD AUTO: 0.6 %
BILIRUB SERPL-MCNC: 0.5 MG/DL (ref 0–1.2)
BUN SERPL-MCNC: 19 MG/DL (ref 6–23)
CALCIUM SERPL-MCNC: 9.4 MG/DL (ref 8.6–10.3)
CHLORIDE SERPL-SCNC: 107 MMOL/L (ref 98–107)
CHOLEST SERPL-MCNC: 178 MG/DL (ref 0–199)
CHOLESTEROL/HDL RATIO: 3.9
CO2 SERPL-SCNC: 27 MMOL/L (ref 21–32)
CREAT SERPL-MCNC: 0.9 MG/DL (ref 0.5–1.3)
EGFRCR SERPLBLD CKD-EPI 2021: >90 ML/MIN/1.73M*2
EOSINOPHIL # BLD AUTO: 0.07 X10*3/UL (ref 0–0.7)
EOSINOPHIL NFR BLD AUTO: 1.1 %
ERYTHROCYTE [DISTWIDTH] IN BLOOD BY AUTOMATED COUNT: 13.6 % (ref 11.5–14.5)
GLUCOSE SERPL-MCNC: 107 MG/DL (ref 74–99)
HBV SURFACE AG SERPL QL IA: NONREACTIVE
HCT VFR BLD AUTO: 45.3 % (ref 41–52)
HCV AB SER QL: NONREACTIVE
HDLC SERPL-MCNC: 45.7 MG/DL
HGB BLD-MCNC: 14.8 G/DL (ref 13.5–17.5)
IMM GRANULOCYTES # BLD AUTO: 0.02 X10*3/UL (ref 0–0.7)
IMM GRANULOCYTES NFR BLD AUTO: 0.3 % (ref 0–0.9)
LDLC SERPL CALC-MCNC: 122 MG/DL
LYMPHOCYTES # BLD AUTO: 1.31 X10*3/UL (ref 1.2–4.8)
LYMPHOCYTES NFR BLD AUTO: 19.7 %
MCH RBC QN AUTO: 30 PG (ref 26–34)
MCHC RBC AUTO-ENTMCNC: 32.7 G/DL (ref 32–36)
MCV RBC AUTO: 92 FL (ref 80–100)
MONOCYTES # BLD AUTO: 0.4 X10*3/UL (ref 0.1–1)
MONOCYTES NFR BLD AUTO: 6 %
NEUTROPHILS # BLD AUTO: 4.8 X10*3/UL (ref 1.2–7.7)
NEUTROPHILS NFR BLD AUTO: 72.3 %
NON HDL CHOLESTEROL: 132 MG/DL (ref 0–149)
NRBC BLD-RTO: 0 /100 WBCS (ref 0–0)
PLATELET # BLD AUTO: 155 X10*3/UL (ref 150–450)
POTASSIUM SERPL-SCNC: 5.2 MMOL/L (ref 3.5–5.3)
PROT SERPL-MCNC: 6.9 G/DL (ref 6.4–8.2)
RBC # BLD AUTO: 4.93 X10*6/UL (ref 4.5–5.9)
RHEUMATOID FACT SER NEPH-ACNC: <10 IU/ML (ref 0–15)
SODIUM SERPL-SCNC: 140 MMOL/L (ref 136–145)
TRIGL SERPL-MCNC: 52 MG/DL (ref 0–149)
VLDL: 10 MG/DL (ref 0–40)
WBC # BLD AUTO: 6.6 X10*3/UL (ref 4.4–11.3)

## 2025-01-25 PROCEDURE — 86431 RHEUMATOID FACTOR QUANT: CPT

## 2025-01-25 PROCEDURE — 85025 COMPLETE CBC W/AUTO DIFF WBC: CPT

## 2025-01-25 PROCEDURE — 87340 HEPATITIS B SURFACE AG IA: CPT

## 2025-01-25 PROCEDURE — 80053 COMPREHEN METABOLIC PANEL: CPT

## 2025-01-25 PROCEDURE — 86803 HEPATITIS C AB TEST: CPT

## 2025-01-25 PROCEDURE — 80061 LIPID PANEL: CPT

## 2025-01-27 ENCOUNTER — HOSPITAL ENCOUNTER (OUTPATIENT)
Dept: RADIOLOGY | Facility: HOSPITAL | Age: 65
Discharge: HOME | End: 2025-01-27
Payer: COMMERCIAL

## 2025-01-27 ENCOUNTER — APPOINTMENT (OUTPATIENT)
Dept: ORTHOPEDIC SURGERY | Facility: CLINIC | Age: 65
End: 2025-01-27
Payer: COMMERCIAL

## 2025-01-27 DIAGNOSIS — M17.11 OSTEOARTHRITIS OF RIGHT KNEE, UNSPECIFIED OSTEOARTHRITIS TYPE: Primary | ICD-10-CM

## 2025-01-27 DIAGNOSIS — M25.552 LEFT HIP PAIN: ICD-10-CM

## 2025-01-27 DIAGNOSIS — M25.562 LEFT KNEE PAIN, UNSPECIFIED CHRONICITY: ICD-10-CM

## 2025-01-27 DIAGNOSIS — M17.12 PRIMARY OSTEOARTHRITIS OF LEFT KNEE: ICD-10-CM

## 2025-01-27 PROCEDURE — 73560 X-RAY EXAM OF KNEE 1 OR 2: CPT | Mod: LT

## 2025-01-27 PROCEDURE — 3049F LDL-C 100-129 MG/DL: CPT | Performed by: ORTHOPAEDIC SURGERY

## 2025-01-27 PROCEDURE — 20610 DRAIN/INJ JOINT/BURSA W/O US: CPT | Performed by: ORTHOPAEDIC SURGERY

## 2025-01-27 PROCEDURE — 99214 OFFICE O/P EST MOD 30 MIN: CPT | Performed by: ORTHOPAEDIC SURGERY

## 2025-01-27 PROCEDURE — 73560 X-RAY EXAM OF KNEE 1 OR 2: CPT | Mod: LEFT SIDE | Performed by: RADIOLOGY

## 2025-01-27 PROCEDURE — 73502 X-RAY EXAM HIP UNI 2-3 VIEWS: CPT | Mod: LEFT SIDE | Performed by: RADIOLOGY

## 2025-01-27 PROCEDURE — 73502 X-RAY EXAM HIP UNI 2-3 VIEWS: CPT | Mod: LT

## 2025-01-27 RX ORDER — LIDOCAINE HYDROCHLORIDE 10 MG/ML
5 INJECTION, SOLUTION INFILTRATION; PERINEURAL
Status: COMPLETED | OUTPATIENT
Start: 2025-01-27 | End: 2025-01-27

## 2025-01-27 RX ORDER — BETAMETHASONE SODIUM PHOSPHATE AND BETAMETHASONE ACETATE 3; 3 MG/ML; MG/ML
2 INJECTION, SUSPENSION INTRA-ARTICULAR; INTRALESIONAL; INTRAMUSCULAR; SOFT TISSUE
Status: COMPLETED | OUTPATIENT
Start: 2025-01-27 | End: 2025-01-27

## 2025-01-27 RX ADMIN — LIDOCAINE HYDROCHLORIDE 5 ML: 10 INJECTION, SOLUTION INFILTRATION; PERINEURAL at 17:17

## 2025-01-27 RX ADMIN — BETAMETHASONE SODIUM PHOSPHATE AND BETAMETHASONE ACETATE 2 ML: 3; 3 INJECTION, SUSPENSION INTRA-ARTICULAR; INTRALESIONAL; INTRAMUSCULAR; SOFT TISSUE at 17:17

## 2025-01-27 NOTE — PROGRESS NOTES
History of present illness: History left hip and knee pain appears that his knee is more inflamed than anything else he has a hip replacement done by Dr. Grewal 1520 years ago    Physical exam:    General: No acute distress or breathing difficulty or discomfort, pleasant and cooperative with the examination.    Extremities: Today the left hip examines well and moves clean flex and extends a little soreness over the IT band and bursa that is gone away the most noticeable leg thing is a swollen painful left knee    The affected left knee was examined and inspected and was tender to touch along the medial and lateral aspect with catching, locking or mechanical symptoms.    The skin was intact without breakdown or open wound.  Old incisions of present were healed.    There was a mild Ivana exam seen with some evidence of instability and weakness in the collateral ligaments with varus valgus stressing and laxity in the anterior or posterior planes.    There was a negative Lachman's test pivot shift test and posterior drawer sign with no foot drop, numbness or tingling.    Sensation, reflexes and pulses in the foot and ankle are preserved.  There was an effusion.  Range of motion showed good straight leg raise with flexion to 150 degrees and extension to 0 degrees.  The patient had the ability to bear weight but with discomfort.  The patient's gait was antalgic secondary to discomfort.    Before aspiration injection the risks of a cortisone injection including infection, local skin irritation, skin atrophy, calcification, continued pain and discomfort, elevated blood sugar, burning, failure to relieve pain, possible late infection were discussed with the patient.    Postprocedure discomfort can be alleviated with additional medications, ice, elevation, rest over the first 24 hours as recommended.    Patient verbalized understanding and wanted to proceed with the planned procedure.    After informed consent was  provided and allergies verified, the patient was positioned appropriately on the bed.  The left knee to be aspirated and injected was prepped and draped in a sterile fashion.  The skin was anesthetized with ethyl chloride spray.  A joint aspiration was to be performed an 18-gauge needle was used otherwise a 22-gauge needle was used to inject the appropriate joint.    Joint injection was performed with a mixture of 5 cc 1% lidocaine plain and 2 cc Celestone Soluspan 6 mg per mL.  The needle was removed and the puncture site closed and sealed with a Band-Aid.  The patient tolerated the procedure well.          Diagnostic studies: X-rays show moderately advanced osteoarthritic left knee    X-rays show a stable left total hip replacement    Impression: Advancing arthritic change left knee    History of prior hip replacement    Plan: Injection left knee today for control of pain and discomfort    If his leg feels completely better we will focus on treating the knee    If he continues to get pain around the hip area a bone scan would be recommended we will see him back to 3 weeks see how the knee injection went talk about other options including brace gel shots will look forward to seeing him back to see how he responded to the knee injection specifically to the whole leg feel better    L Inj/Asp: L knee on 1/27/2025 5:17 PM  Indications: pain and diagnostic evaluation  Details: 22 G needle, anteromedial approach  Medications: 2 mL betamethasone acet,sod phos 6 mg/mL; 5 mL lidocaine 10 mg/mL (1 %)  Outcome: tolerated well, no immediate complications  Procedure, treatment alternatives, risks and benefits explained, specific risks discussed. Consent was given by the patient. Immediately prior to procedure a time out was called to verify the correct patient, procedure, equipment, support staff and site/side marked as required. Patient was prepped and draped in the usual sterile fashion.

## 2025-02-14 DIAGNOSIS — G89.29 CHRONIC BILATERAL LOW BACK PAIN WITHOUT SCIATICA: ICD-10-CM

## 2025-02-14 DIAGNOSIS — K21.9 GASTROESOPHAGEAL REFLUX DISEASE WITHOUT ESOPHAGITIS: ICD-10-CM

## 2025-02-14 DIAGNOSIS — M54.50 CHRONIC BILATERAL LOW BACK PAIN WITHOUT SCIATICA: ICD-10-CM

## 2025-02-15 RX ORDER — ESOMEPRAZOLE MAGNESIUM 40 MG/1
40 CAPSULE, DELAYED RELEASE ORAL DAILY
Qty: 90 CAPSULE | Refills: 0 | Status: SHIPPED | OUTPATIENT
Start: 2025-02-15 | End: 2025-05-16

## 2025-02-15 RX ORDER — TRAMADOL HYDROCHLORIDE 50 MG/1
50-100 TABLET ORAL EVERY 12 HOURS PRN
Qty: 60 TABLET | Refills: 0 | Status: SHIPPED | OUTPATIENT
Start: 2025-02-15

## 2025-03-03 ENCOUNTER — APPOINTMENT (OUTPATIENT)
Dept: ORTHOPEDIC SURGERY | Facility: CLINIC | Age: 65
End: 2025-03-03
Payer: COMMERCIAL

## 2025-03-03 DIAGNOSIS — M17.11 OSTEOARTHRITIS OF RIGHT KNEE, UNSPECIFIED OSTEOARTHRITIS TYPE: Primary | ICD-10-CM

## 2025-03-03 PROCEDURE — 3049F LDL-C 100-129 MG/DL: CPT | Performed by: ORTHOPAEDIC SURGERY

## 2025-03-03 PROCEDURE — 99213 OFFICE O/P EST LOW 20 MIN: CPT | Performed by: ORTHOPAEDIC SURGERY

## 2025-03-03 NOTE — PROGRESS NOTES
History of present illness: History of knee arthritis cortisone shot about a month ago doing well    Physical exam:    General: No acute distress or breathing difficulty or discomfort, pleasant and cooperative with the examination.    Extremities: Knee is much improved injection site clean and dry    Full extension    Flexion to about 170    N negative Ivana negative Lachman's negative pivot shift no redness no warmth minimal swelling no gross deformity  Bladder integrity is noted to be intact    Diagnostic studies: New x-ray    Impression: Left knee doing well after cortisone    Plan: Left knee doing well after cortisone follow-up as needed if it is more than 4 months repeat cortisone if he fails to continue to have relief he will call to order gel injections

## 2025-03-13 DIAGNOSIS — M54.50 CHRONIC BILATERAL LOW BACK PAIN WITHOUT SCIATICA: ICD-10-CM

## 2025-03-13 DIAGNOSIS — G89.29 CHRONIC BILATERAL LOW BACK PAIN WITHOUT SCIATICA: ICD-10-CM

## 2025-03-17 RX ORDER — TRAMADOL HYDROCHLORIDE 50 MG/1
50-100 TABLET ORAL EVERY 12 HOURS PRN
Qty: 60 TABLET | Refills: 0 | Status: SHIPPED | OUTPATIENT
Start: 2025-03-17

## 2025-04-14 ENCOUNTER — APPOINTMENT (OUTPATIENT)
Dept: PRIMARY CARE | Facility: CLINIC | Age: 65
End: 2025-04-14
Payer: COMMERCIAL

## 2025-04-14 VITALS
TEMPERATURE: 98.3 F | DIASTOLIC BLOOD PRESSURE: 68 MMHG | RESPIRATION RATE: 16 BRPM | BODY MASS INDEX: 36.84 KG/M2 | SYSTOLIC BLOOD PRESSURE: 128 MMHG | HEART RATE: 83 BPM | OXYGEN SATURATION: 96 % | HEIGHT: 73 IN | WEIGHT: 278 LBS

## 2025-04-14 DIAGNOSIS — E55.9 VITAMIN D DEFICIENCY: ICD-10-CM

## 2025-04-14 DIAGNOSIS — E66.812 CLASS 2 SEVERE OBESITY WITH SERIOUS COMORBIDITY AND BODY MASS INDEX (BMI) OF 36.0 TO 36.9 IN ADULT, UNSPECIFIED OBESITY TYPE: ICD-10-CM

## 2025-04-14 DIAGNOSIS — R33.8 BENIGN PROSTATIC HYPERPLASIA WITH URINARY RETENTION: ICD-10-CM

## 2025-04-14 DIAGNOSIS — G47.61 PERIODIC LIMB MOVEMENT: ICD-10-CM

## 2025-04-14 DIAGNOSIS — R33.9 URINARY RETENTION: ICD-10-CM

## 2025-04-14 DIAGNOSIS — G47.33 OSA (OBSTRUCTIVE SLEEP APNEA): ICD-10-CM

## 2025-04-14 DIAGNOSIS — M54.50 CHRONIC BILATERAL LOW BACK PAIN WITHOUT SCIATICA: ICD-10-CM

## 2025-04-14 DIAGNOSIS — K21.9 GASTROESOPHAGEAL REFLUX DISEASE WITHOUT ESOPHAGITIS: ICD-10-CM

## 2025-04-14 DIAGNOSIS — F17.200 CURRENT SMOKER: ICD-10-CM

## 2025-04-14 DIAGNOSIS — Z79.899 HIGH RISK MEDICATION USE: ICD-10-CM

## 2025-04-14 DIAGNOSIS — N40.1 BENIGN PROSTATIC HYPERPLASIA WITH URINARY RETENTION: ICD-10-CM

## 2025-04-14 DIAGNOSIS — E11.9 TYPE 2 DIABETES MELLITUS WITHOUT COMPLICATION, WITHOUT LONG-TERM CURRENT USE OF INSULIN: ICD-10-CM

## 2025-04-14 DIAGNOSIS — E66.01 CLASS 2 SEVERE OBESITY WITH SERIOUS COMORBIDITY AND BODY MASS INDEX (BMI) OF 36.0 TO 36.9 IN ADULT, UNSPECIFIED OBESITY TYPE: ICD-10-CM

## 2025-04-14 DIAGNOSIS — G89.29 CHRONIC BILATERAL LOW BACK PAIN WITHOUT SCIATICA: ICD-10-CM

## 2025-04-14 DIAGNOSIS — E78.2 MIXED HYPERLIPIDEMIA: Primary | ICD-10-CM

## 2025-04-14 PROCEDURE — 3008F BODY MASS INDEX DOCD: CPT | Performed by: FAMILY MEDICINE

## 2025-04-14 PROCEDURE — 99214 OFFICE O/P EST MOD 30 MIN: CPT | Performed by: FAMILY MEDICINE

## 2025-04-14 PROCEDURE — 3074F SYST BP LT 130 MM HG: CPT | Performed by: FAMILY MEDICINE

## 2025-04-14 PROCEDURE — 3078F DIAST BP <80 MM HG: CPT | Performed by: FAMILY MEDICINE

## 2025-04-14 PROCEDURE — 3049F LDL-C 100-129 MG/DL: CPT | Performed by: FAMILY MEDICINE

## 2025-04-14 RX ORDER — CLINDAMYCIN HYDROCHLORIDE 300 MG/1
300 CAPSULE ORAL DAILY
COMMUNITY
Start: 2025-04-03

## 2025-04-14 NOTE — PATIENT INSTRUCTIONS
Follow up in 3 months with labs to be done PRIOR.    It was a pleasure to see you today. Thank you for choosing us for your health care needs.    If you have lab or other testing completed and have not been informed of results within one week, please call the office for your results.    If you haven't done so, consider signing up for [facility], the [facility] personal health record. Ask the staff how you can get started.

## 2025-04-14 NOTE — PROGRESS NOTES
Subjective   Patient ID: Ivan Johnson is a 64 y.o. male who presents for Follow-up (3 month follow up) and Med Management (Currently on clindamycin for dental extraction and bone graft).    HPI     Labs: 10/26/24  PSA: 10/26/24  ColoRect: Due / Ordered (last 11/2013)    Last office visit: 1/13/2025  Patient has hyperlipidemia.  Patient tries to limit the amount of fatty foods and high cholesterol foods that he consumes  His hyperlipidemia is currently treated with dietary/ lifestyle changes.      He has GERD.  He was treated with Esomeprazole.   He denies any breakthrough reflux symptoms.      Patient has obstructive sleep apnea.  Patient utilizes a CPAP machine on a nightly basis.  He is tolerating the machine well and does feel well rested in the mornings.     He has periodic leg movement disorder.  Symptoms were stable when he was on Requip.     He has chronic lower back pain and hip pain.  This has been effectively treated with Tramadol as needed.  NSAIDs have not provided adequate relief in the past.     He has a history of BPH/urinary frequency/urinary retention.  Symptoms have been well-controlled on the current combination of oxybutynin and Flomax.      He has nonalcoholic fatty liver disease.  Pt underwent laboratory evaluation as well as an ultrasound of the liver.   Patient has not had an actual biopsy performed.      He has vitamin D deficiency.  Patient continues to take over-the-counter vitamin D supplementation of 1000 units daily.       OARRS:  Sushant Wiseman DO on 4/14/2025  5:48 PM  I have personally reviewed the OARRS report for Ivan Johnson. I have considered the risks of abuse, dependence, addiction and diversion and I believe that it is clinically appropriate for Ivan Johnson to be prescribed this medication    Is the patient prescribed a combination of a benzodiazepine and opioid?  No    Last Urine Drug Screen / ordered today: Yes  No results found for this or any previous visit (from  the past 8760 hours).  N/A    Clinical rationale for not completing a Urine Drug Screen: UDS was ordered at his next follow up appt.      Controlled Substance Agreement:  Date of the Last Agreement: 4/14/2025  Reviewed Controlled Substance Agreement including but not limited to the benefits, risks, and alternatives to treatment with a Controlled Substance medication(s).    Opioids:  What is the patient's goal of therapy? Reduce pain on as needed basis  Is this being achieved with current treatment? yes    I have calculated the patient's Morphine Dose Equivalent (MED):   I have considered referral to Pain Management and/or a specialist, and do not feel it is necessary at this time.    I feel that it is clinically indicated to continue this current medication regimen after consideration of alternative therapies, and other non-opioid treatment.    Opioid Risk Screening:  Family History of Substance Abuse  Alcohol: 0 (7/3/2024  6:00 PM)  Illegal Drugs: 0 (7/3/2024  6:00 PM)  Prescription Drugs: 0 (7/3/2024  6:00 PM)    Personal History of Substance Abuse  Alcohol: 0 (7/3/2024  6:00 PM)  Illegal Drugs: 0 (7/3/2024  6:00 PM)  Prescription Drugs: 0 (7/3/2024  6:00 PM)    Patient Age (16-45)  Age (16-45): 0 (7/3/2024  6:00 PM)    History of Preadolescent Sexual Abuse  History of Preadolescent Sexual Abuse: .0 (7/3/2024  6:00 PM)    Psychological Disease  Attention Deficit Disorder, Obsessive Compulsive Disorder, Bipolar, Schizophrenia: 0 (7/3/2024  6:00 PM)  Depression: 0 (7/3/2024  6:00 PM)    Total Score  Total Score: 0 (7/3/2024  6:00 PM)    Total Score Risk Category  TOTAL SCORE CATEGORY: Low Risk (0-3) (7/3/2024  6:00 PM)        Pain Assessment:  Analgesia  What was your pain level on average during the past week?: 4  What was your pain level at its worst during the past week?: 7  What percentage of your pain has been relieved during the past week?: 35 %  Is the amount of pain relief you are now obtaining from your  "current pain relievers enough to make a real difference in your life?: Y  Query to Clinician: Is the patient's pain relief clinically significant?: Yes    Activities of Daily Living  Physical Functioning: Same  Family Relationships: Same  Social Relationships: Same  Mood: Same  Sleep Patterns: Same  Overall Functioning: Same    Adverse Events  Is patient experiencing any side effects from current pain relievers?: N  Patient's Overall Severity of Side Effects: None      Assessment  Is your overall impression that this patient is benefiting from opioid therapy?: Yes  Specific Analgesic Plan: Continue present regimen        Review of Systems  Constitutional: Patient denies any fever, chills, loss of appetite, or unexplained weight loss.  Cardiovascular: Patient denies any chest pain, shortness of breath with exertion, tachycardia, palpitations, orthopnea, or paroxysmal nocturnal dyspnea.  Respiratory: Patient denies any cough, shortness breath, or wheezing.  Gastrointestinal: Patient denies any nausea, vomiting, diarrhea, constipation, melena, hematochezia, or breakthrough reflux symptoms.  Skin: Denies any rashes or skin lesions.   Neurology: Patient denies any new motor or sensory losses.  Denies any numbness, tingling, weakness, and incoordination of the extremities.  Patient also denies any tremor, seizures, or gait instability.  Endocrinology: Denies any polyuria, polydipsia, polyphagia, or heat/cold intolerance.    MS:  He has chronic lower back pain.  This is unchanged but persistent.    :  No worsening urinary frequency or urgency.     Objective   /68   Pulse 83   Temp 36.8 °C (98.3 °F) (Temporal)   Resp 16   Ht 1.854 m (6' 1\")   Wt 126 kg (278 lb)   SpO2 96%   BMI 36.68 kg/m²     Physical Exam  General Appearance: Alert and cooperative, in no acute distress, well-developed/well-nourished.  Neck: Supple and without adenopathy or rigidity.  There is no JVD at 90° and no carotid bruits are noted.  " There is no thyromegaly, thyroid tenderness, or palpable thyroid nodules.  Heart: Regular rate and rhythm without murmur or ectopy.  Respiratory: Lungs are clear to auscultation bilaterally with good air exchange.  Good respiratory effort and no accessory muscle use.  Skin: Good turgor, moist, warm and without rashes or lesions.  Neurological exam: Alert and oriented ×3, no tremor, normal gait.  Extremities: No clubbing, cyanosis, or edema      Assessment/Plan   Hyperlipidemia:   Patient will continue with the current medication.    Dietary changes, exercise, and maintenance of healthy weight were discussed at length.  Lab Results   Component Value Date    CHOL 178 01/25/2025    HDL 45.7 01/25/2025    LDLCALC 122 (H) 01/25/2025    TRIG 52 01/25/2025    CHHDL 3.9 01/25/2025    VLDL 10 01/25/2025    NHDL 132 01/25/2025            Diabetes mellitus type 2 without complication and without long-term use of insulin:  Lab Results   Component Value Date    HGBA1C 6.5 (H) 10/26/2024   A1c was elevated on last labs.  We will continue with conservative treatment for now.  Dietary changes, exercise, and maintenance of the healthy weight were encouraged.  Patient was advised to have a diabetic eye exam annually.  Patient was also advised to check the feet on a regular basis and observe for skin changes, wound formation, or callus formation.     Chronic bilateral lower back pain without sciatica:  Stable based on symptoms.  NSAIDs have been ineffective for him in the past.  Pain continues to be slowly progressive over years of time.  Managed with a combination of gabapentin and as needed tramadol.     Vitamin D deficiency:  Vitamin D was low on his last labs.    He was encouraged to be compliant with daily vitamin D supplementation.  Lab Results   Component Value Date    VITD25 23 (L) 10/26/2024    VITD25 26 (L) 03/08/2024    VITD25 28 (A) 09/15/2023   Will continue to monitor with lab.     Periodic limb movement  disorder:  Stable based on symptoms.  Continue ropinirole at the current dose.     Benign prostatic hypertrophy with urinary retention:  Stable based on symptoms and he denies any increased urinary urgency or frequency.  Will continue the current treatment plan.     Obstructive sleep apnea:  Stable based on symptoms.  Patient will continue CPAP therapy.  Patient utilizes a CPAP for more than 4 hours per night and continues to benefit from use.  He does feel refreshed in the mornings.     Current smoker:  Patient understands that continued smoking will increase the risks of lung disease, vascular disease, and multiple malignancies.  Pt. has been encouraged to work on smoking cessation and available smoking cessation aids were discussed.     GERD:  Stable based on symptoms.  Continue the current medication and appropriate dietary changes.    Obesity:   Dietary changes, exercise, and maintenance of a healthy weight were discussed at length.  Goal is to achieve a BMI less than 25.     High risk medication use:  Urine drug screen and controlled substance agreement obtained today.        Orders Placed This Encounter   Procedures    Hemoglobin A1C    Comprehensive Metabolic Panel    Lipid Panel    Albumin-Creatinine Ratio, Urine Random    Vitamin D 25-Hydroxy,Total (for eval of Vitamin D levels)    Drug Screen, Urine With Reflex to Confirmation       CONTINUE CURRENT MEDICATION WITHOUT CHANGE  Current Outpatient Medications   Medication Instructions    ascorbic acid (VITAMIN C) 1,000 mg, Daily RT    cholecalciferol (Vitamin D-3) 25 MCG (1000 UT) capsule Take by mouth.    clindamycin (CLEOCIN) 300 mg, Daily    clotrimazole-betamethasone (Lotrisone) cream 1 Application, 2 times daily    erythromycin base (E-Mycin) 500 mg tablet TAKE 2 TABLETS 1 hour before dental appointment    esomeprazole (NEXIUM) 40 mg, oral, Daily    gabapentin (NEURONTIN) 300 mg, oral, 2 times daily, Take 1 to 2 capsules nightly.  Titrate up to 2  capsules nightly after 1 week.    ibuprofen 800 mg, Every 6 hours PRN    lidocaine (Lidocaine Viscous) 2 % solution Soak approx 1.25 mL in gauze/cotton and apply to affected area every 4-6 hours prn pain.  Spit the medication, do not swallow.    magnesium oxide (Mag-Ox) 400 mg (241.3 mg magnesium) tablet 1 tablet, Daily    meloxicam (MOBIC) 15 mg, oral, Daily    mometasone (Elocon) 0.1 % ointment Topical, Daily    naloxone (NARCAN) 4 mg, nasal, As needed, May repeat every 2-3 minutes if needed, alternating nostrils, until medical assistance becomes available.    rOPINIRole (REQUIP) 0.5 mg, oral, Nightly    traMADol (ULTRAM)  mg, oral, Every 12 hours PRN

## 2025-04-15 LAB
AMPHETAMINES UR QL: NEGATIVE NG/ML
BARBITURATES UR QL: NEGATIVE NG/ML
BENZODIAZ UR QL: NEGATIVE NG/ML
BZE UR QL: NEGATIVE NG/ML
CREAT UR-MCNC: 148.6 MG/DL
FENTANYL UR QL SCN: NEGATIVE NG/ML
METHADONE UR QL: NEGATIVE NG/ML
OPIATES UR QL: NEGATIVE NG/ML
OXIDANTS UR QL: NEGATIVE MCG/ML
OXYCODONE UR QL: NEGATIVE NG/ML
PCP UR QL: NEGATIVE NG/ML
PH UR: 7.1 [PH] (ref 4.5–9)
QUEST NOTES AND COMMENTS: NORMAL
THC UR QL: NEGATIVE NG/ML

## 2025-04-17 DIAGNOSIS — G89.29 CHRONIC BILATERAL LOW BACK PAIN WITHOUT SCIATICA: ICD-10-CM

## 2025-04-17 DIAGNOSIS — M54.50 CHRONIC BILATERAL LOW BACK PAIN WITHOUT SCIATICA: ICD-10-CM

## 2025-04-18 RX ORDER — TRAMADOL HYDROCHLORIDE 50 MG/1
50-100 TABLET ORAL EVERY 12 HOURS PRN
Qty: 60 TABLET | Refills: 0 | Status: SHIPPED | OUTPATIENT
Start: 2025-04-18

## 2025-05-08 ENCOUNTER — OFFICE VISIT (OUTPATIENT)
Dept: URGENT CARE | Age: 65
End: 2025-05-08
Payer: COMMERCIAL

## 2025-05-08 VITALS
SYSTOLIC BLOOD PRESSURE: 137 MMHG | HEART RATE: 74 BPM | RESPIRATION RATE: 20 BRPM | OXYGEN SATURATION: 97 % | DIASTOLIC BLOOD PRESSURE: 77 MMHG | BODY MASS INDEX: 36.84 KG/M2 | TEMPERATURE: 98.3 F | HEIGHT: 73 IN | WEIGHT: 278 LBS

## 2025-05-08 DIAGNOSIS — B07.0 PLANTAR WARTS: Primary | ICD-10-CM

## 2025-05-08 PROCEDURE — 3049F LDL-C 100-129 MG/DL: CPT | Performed by: PHYSICIAN ASSISTANT

## 2025-05-08 PROCEDURE — 3078F DIAST BP <80 MM HG: CPT | Performed by: PHYSICIAN ASSISTANT

## 2025-05-08 PROCEDURE — 3008F BODY MASS INDEX DOCD: CPT | Performed by: PHYSICIAN ASSISTANT

## 2025-05-08 PROCEDURE — 3075F SYST BP GE 130 - 139MM HG: CPT | Performed by: PHYSICIAN ASSISTANT

## 2025-05-08 PROCEDURE — 99214 OFFICE O/P EST MOD 30 MIN: CPT | Performed by: PHYSICIAN ASSISTANT

## 2025-05-08 RX ORDER — DOXYCYCLINE 100 MG/1
100 CAPSULE ORAL 2 TIMES DAILY
Qty: 14 CAPSULE | Refills: 0 | Status: SHIPPED | OUTPATIENT
Start: 2025-05-08 | End: 2025-05-15

## 2025-05-08 NOTE — PROGRESS NOTES
"Subjective   Patient ID: Ivan Johnson is a 64 y.o. male who presents for Foot Pain (Bilat foot pain radiates R foot, concern for warts x 2wks nkt ).  HPI patient presents for evaluation of plantars warts on the right foot.  Patient has relatively long recent history of feeling of pain and pressure in the plantar surface of the right foot in the arch and near the ball of the foot.  Patient attempted over-the-counter wart removal without success.  No other similar areas of concern.  No other complaints.    Review of Systems    Constitutional:  See HPI    Integumentary: See HPI  Neurologic:  Alert and oriented X4, No numbness, No tingling.    All other systems are negative     Objective     /77   Pulse 74   Temp 36.8 °C (98.3 °F)   Resp 20   Ht 1.854 m (6' 1\")   Wt 126 kg (278 lb)   SpO2 97%   BMI 36.68 kg/m²     Physical Exam    General:  Alert and oriented, No acute distress.    Eye:  Pupils are equal, round and reactive to light, Normal conjunctiva.    HENT:  Normocephalic,   Neck:  Supple    Respiratory: Respirations are non-labored   Musculoskeletal: Normal ROM and strength  Integumentary: Plantar surface of the right foot with 2 small, firm, rough, slightly raised, slightly tender lesions; one in the arch, the other on the ball of the foot  Neurologic:  Alert, Oriented, Normal sensory, Cranial Nerves II-XII are grossly intact  Psychiatric:  Cooperative, Appropriate mood & affect.    CPT 51640, destruction of benign lesion    Date/Time: 5/8/2025 12:09 PM    Performed by: Denys Armstrong PA-C  Authorized by: Denys Armstrong PA-C    Consent:     Consent obtained:  Verbal    Consent given by:  Patient    Risks discussed:  Infection and bleeding  Universal protocol:     Patient identity confirmed:  Verbally with patient  Pre-procedure details:     Skin preparation:  Chlorhexidine  Anesthesia:     Anesthesia method:  Local infiltration    Local anesthetic:  Lidocaine 1% WITH epi  Procedure specific " details:      Warts were identified and cleansed with Betasept solution.  1% lidocaine with epinephrine was treated at the base for local anesthesia.  Once local anesthesia was achieved, a portable Bovie cautery was utilized to destroy the lesion in the dermis.  Iris scissors were then utilized to excise the charred tissue leaving beefy red bases and both lesions.  Wounds were cleansed with Betasept and dressed with bacitracin and gauze.  Patient tolerated procedure well.        Assessment/Plan   Exam confirmed 2 separate plantar warts.  Good results with procedure.  Wound care reviewed.  Patient requested doxycycline with history of prostate cancer and compromised immune system, the patient was advised by his healthcare team to obtain antibiotics whenever undergoing a procedure.  Patient's clinical presentation is otherwise unremarkable at this time. Patient is discharged with instructions to follow-up with primary care or seek emergency medical attention for worsening symptoms or any new concerns.  Problem List Items Addressed This Visit    None  Visit Diagnoses         Plantar warts    -  Primary    Relevant Medications    doxycycline (Vibramycin) 100 mg capsule            Final diagnoses:   [B07.0] Plantar warts

## 2025-05-08 NOTE — PATIENT INSTRUCTIONS
Keep wounds dressed with antibiotic ointment and Band-Aid or gauze.  Wash daily with soap and water.  Do not use harsh chemicals for cleansing.  Continue wound care until wounds heal.

## 2025-05-17 DIAGNOSIS — K21.9 GASTROESOPHAGEAL REFLUX DISEASE WITHOUT ESOPHAGITIS: ICD-10-CM

## 2025-05-17 RX ORDER — ESOMEPRAZOLE MAGNESIUM 40 MG/1
40 CAPSULE, DELAYED RELEASE ORAL DAILY
Qty: 90 CAPSULE | Refills: 0 | Status: SHIPPED | OUTPATIENT
Start: 2025-05-17 | End: 2025-08-15

## 2025-05-20 ENCOUNTER — OFFICE VISIT (OUTPATIENT)
Dept: URGENT CARE | Age: 65
End: 2025-05-20
Payer: COMMERCIAL

## 2025-05-20 VITALS
TEMPERATURE: 98.4 F | RESPIRATION RATE: 20 BRPM | OXYGEN SATURATION: 96 % | HEIGHT: 73 IN | SYSTOLIC BLOOD PRESSURE: 134 MMHG | WEIGHT: 275 LBS | BODY MASS INDEX: 36.45 KG/M2 | HEART RATE: 69 BPM | DIASTOLIC BLOOD PRESSURE: 67 MMHG

## 2025-05-20 DIAGNOSIS — B07.0 PLANTAR WART: Primary | ICD-10-CM

## 2025-05-20 DIAGNOSIS — G89.29 CHRONIC BILATERAL LOW BACK PAIN WITHOUT SCIATICA: ICD-10-CM

## 2025-05-20 DIAGNOSIS — M54.50 CHRONIC BILATERAL LOW BACK PAIN WITHOUT SCIATICA: ICD-10-CM

## 2025-05-20 DIAGNOSIS — M79.671 FOOT PAIN, RIGHT: ICD-10-CM

## 2025-05-20 PROCEDURE — 3075F SYST BP GE 130 - 139MM HG: CPT | Performed by: NURSE PRACTITIONER

## 2025-05-20 PROCEDURE — 3049F LDL-C 100-129 MG/DL: CPT | Performed by: NURSE PRACTITIONER

## 2025-05-20 PROCEDURE — 3078F DIAST BP <80 MM HG: CPT | Performed by: NURSE PRACTITIONER

## 2025-05-20 PROCEDURE — 3008F BODY MASS INDEX DOCD: CPT | Performed by: NURSE PRACTITIONER

## 2025-05-20 PROCEDURE — 99213 OFFICE O/P EST LOW 20 MIN: CPT | Performed by: NURSE PRACTITIONER

## 2025-05-20 ASSESSMENT — ENCOUNTER SYMPTOMS
ENDOCRINE NEGATIVE: 1
ALLERGIC/IMMUNOLOGIC NEGATIVE: 1
PSYCHIATRIC NEGATIVE: 1
HEMATOLOGIC/LYMPHATIC NEGATIVE: 1
PALPITATIONS: 0
EYES NEGATIVE: 1
CONSTITUTIONAL NEGATIVE: 1
MUSCULOSKELETAL NEGATIVE: 1
NEUROLOGICAL NEGATIVE: 1
RESPIRATORY NEGATIVE: 1
GASTROINTESTINAL NEGATIVE: 1

## 2025-05-20 NOTE — PROGRESS NOTES
"Subjective   Patient ID: Ivan Johnson is a 64 y.o. male. They present today with a chief complaint of Pain (Foot pain R foot, x 2wks plantar wart removal pain remains ).    History of Present Illness  HPI    Pt presents to urgent care with c/o    pain in right foot s/p plantar wart removal in this clinic 2 weeks ago.  Patient also reports he has a job where he stands all day and both of his feet hurt frequently.  Reports he took full course of prescription antibiotics after the procedure.  Denies drainage.  Pt denies CP, SOB, palpitations, fevers, abd pain, n/v/d, sick contacts, recent travel.        Past Medical History  Allergies as of 05/20/2025 - Reviewed 05/20/2025   Allergen Reaction Noted    Penicillins Unknown 04/04/2023    Varenicline Nausea Only 04/04/2023       Prescriptions Prior to Admission[1]     Medical History[2]    Surgical History[3]     reports that he has been smoking cigarettes. He has never used smokeless tobacco. He reports that he does not drink alcohol and does not use drugs.    Review of Systems  Review of Systems   Constitutional: Negative.    HENT: Negative.     Eyes: Negative.    Respiratory: Negative.     Cardiovascular:  Negative for chest pain and palpitations.   Gastrointestinal: Negative.    Endocrine: Negative.    Genitourinary: Negative.    Musculoskeletal: Negative.    Skin: Negative.    Allergic/Immunologic: Negative.    Neurological: Negative.    Hematological: Negative.    Psychiatric/Behavioral: Negative.     All other systems reviewed and are negative.                                 Objective    Vitals:    05/20/25 1229   BP: 134/67   Pulse: 69   Resp: 20   Temp: 36.9 °C (98.4 °F)   SpO2: 96%   Weight: 125 kg (275 lb)   Height: 1.854 m (6' 1\")     No LMP for male patient.    Physical Exam  Vitals and nursing note reviewed.   Constitutional:       General: He is not in acute distress.     Appearance: Normal appearance. He is not ill-appearing or toxic-appearing.   HENT: "      Head: Atraumatic.      Mouth/Throat:      Mouth: Mucous membranes are moist.      Pharynx: Oropharynx is clear.   Eyes:      Extraocular Movements: Extraocular movements intact.      Conjunctiva/sclera: Conjunctivae normal.      Pupils: Pupils are equal, round, and reactive to light.   Cardiovascular:      Rate and Rhythm: Normal rate.   Pulmonary:      Effort: Pulmonary effort is normal.   Musculoskeletal:        Feet:    Skin:     General: Skin is warm and dry.   Neurological:      General: No focal deficit present.      Mental Status: He is alert and oriented to person, place, and time.   Psychiatric:         Mood and Affect: Mood normal.         Behavior: Behavior normal.         Thought Content: Thought content normal.         Procedures    Point of Care Test & Imaging Results from this visit  No results found for this visit on 05/20/25.   Imaging  No results found.    Cardiology, Vascular, and Other Imaging  No other imaging results found for the past 2 days      Diagnostic study results (if any) were reviewed by FRED Collier.    Assessment/Plan   Allergies, medications, history, and pertinent labs/EKGs/Imaging reviewed by FRED Collier.     Medical Decision Making   Patient presents with pain in right foot s/p wart removal 2 weeks ago.  On exam patient with 2 wounds to bottom of right foot.  One near the lateral aspect is very small and appears almost completely healed.  Patient with a larger wound near the ball of his foot.  It remains open but appears to be healing well.  There is no erythema, swelling, drainage from site.  Tissue appears slightly macerated likely from being moist inside his sock and shoe all day.  Patient reports he has been keeping it covered with bacitracin and a bandage.  Patient advised he may continue to do this but should allow it to be open to air at some point during the day.  Patient reports he is borderline diabetic.  Will give referral to podiatry  for continued follow-up and management.At time of discharge patient was clinically well-appearing and HDS for outpatient management. The patient was educated regarding diagnosis, supportive care, OTC and Rx medications. The patient was given the opportunity to ask questions prior to discharge.  They verbalized understanding of my discussion of the plans for treatment, expected course, indications to return to  or seek further evaluation in ED, and the need for timely follow up as directed.       Orders and Diagnoses  Diagnoses and all orders for this visit:  Plantar wart  -     Referral to Podiatry; Future  Foot pain, right  -     Referral to Podiatry; Future      Medical Admin Record      Patient disposition: Home    Electronically signed by FRED Collier  1:01 PM           [1] (Not in a hospital admission)   [2]   Past Medical History:  Diagnosis Date    Benign prostatic hyperplasia without lower urinary tract symptoms 01/04/2023    Benign prostate hyperplasia    Dorsalgia, unspecified 01/04/2023    Chronic back pain    Gastro-esophageal reflux disease without esophagitis 01/04/2023    GERD (gastroesophageal reflux disease)    Male erectile dysfunction, unspecified 08/31/2015    Erectile dysfunction    Periodic limb movement disorder 01/04/2023    Periodic limb movement    Personal history of other diseases of the musculoskeletal system and connective tissue 03/17/2016    History of osteonecrosis    Retention of urine, unspecified 01/04/2023    Urinary retention    Sleep apnea, unspecified 09/14/2020    Apnea, sleep   [3]   Past Surgical History:  Procedure Laterality Date    COLONOSCOPY  09/01/2015    Complete Colonoscopy    HERNIA REPAIR  08/31/2015    Inguinal Hernia Repair    OTHER SURGICAL HISTORY  08/31/2015    Orchiectomy Left    TOTAL HIP ARTHROPLASTY  09/01/2015    Hip Replacement

## 2025-05-21 RX ORDER — TRAMADOL HYDROCHLORIDE 50 MG/1
50-100 TABLET, FILM COATED ORAL EVERY 12 HOURS PRN
Qty: 60 TABLET | Refills: 0 | Status: SHIPPED | OUTPATIENT
Start: 2025-05-21

## 2025-06-16 DIAGNOSIS — M54.50 CHRONIC BILATERAL LOW BACK PAIN WITHOUT SCIATICA: ICD-10-CM

## 2025-06-16 DIAGNOSIS — G89.29 CHRONIC BILATERAL LOW BACK PAIN WITHOUT SCIATICA: ICD-10-CM

## 2025-06-17 RX ORDER — TRAMADOL HYDROCHLORIDE 50 MG/1
50-100 TABLET, FILM COATED ORAL EVERY 12 HOURS PRN
Qty: 60 TABLET | Refills: 0 | Status: SHIPPED | OUTPATIENT
Start: 2025-06-18

## 2025-06-27 ENCOUNTER — APPOINTMENT (OUTPATIENT)
Dept: PODIATRY | Facility: CLINIC | Age: 65
End: 2025-06-27
Payer: COMMERCIAL

## 2025-07-01 ENCOUNTER — APPOINTMENT (OUTPATIENT)
Dept: PODIATRY | Facility: CLINIC | Age: 65
End: 2025-07-01
Payer: COMMERCIAL

## 2025-07-03 ENCOUNTER — APPOINTMENT (OUTPATIENT)
Facility: CLINIC | Age: 65
End: 2025-07-03
Payer: COMMERCIAL

## 2025-07-03 DIAGNOSIS — M20.42 HAMMER TOES OF BOTH FEET: ICD-10-CM

## 2025-07-03 DIAGNOSIS — M79.671 PAIN IN BOTH FEET: Primary | ICD-10-CM

## 2025-07-03 DIAGNOSIS — M77.41 METATARSALGIA OF BOTH FEET: ICD-10-CM

## 2025-07-03 DIAGNOSIS — M20.41 HAMMER TOES OF BOTH FEET: ICD-10-CM

## 2025-07-03 DIAGNOSIS — M79.672 PAIN IN BOTH FEET: Primary | ICD-10-CM

## 2025-07-03 DIAGNOSIS — E11.9 TYPE 2 DIABETES MELLITUS WITHOUT COMPLICATION, WITHOUT LONG-TERM CURRENT USE OF INSULIN: ICD-10-CM

## 2025-07-03 DIAGNOSIS — M77.42 METATARSALGIA OF BOTH FEET: ICD-10-CM

## 2025-07-03 PROCEDURE — 3049F LDL-C 100-129 MG/DL: CPT | Performed by: PODIATRIST

## 2025-07-03 PROCEDURE — 99203 OFFICE O/P NEW LOW 30 MIN: CPT | Performed by: PODIATRIST

## 2025-07-03 NOTE — PROGRESS NOTES
"Chief Complaint   Patient presents with    burning/stinging bilateral    Plantar Warts     Recently removed      History Of Present Illness  Ivan Johnson is a 64 y.o. male presenting with chief complaint of bilateral foot pain.  He states it is to \"the ball\" of the foot.  He states he gets burning and tingling.  He also complains of plantar warts.  He states these were recently removed in urgent care.  He is a type II diabetic.  His last hemoglobin A1c was 6.5     Past Medical History  He has a past medical history of Benign prostatic hyperplasia without lower urinary tract symptoms (01/04/2023), Dorsalgia, unspecified (01/04/2023), Gastro-esophageal reflux disease without esophagitis (01/04/2023), Male erectile dysfunction, unspecified (08/31/2015), Periodic limb movement disorder (01/04/2023), Personal history of other diseases of the musculoskeletal system and connective tissue (03/17/2016), Retention of urine, unspecified (01/04/2023), and Sleep apnea, unspecified (09/14/2020).    Surgical History  He has a past surgical history that includes Other surgical history (08/31/2015); Hernia repair (08/31/2015); Total hip arthroplasty (09/01/2015); and Colonoscopy (09/01/2015).     Social History  He reports that he has been smoking cigarettes. He has never used smokeless tobacco. He reports that he does not drink alcohol and does not use drugs.    Family History  Family History[1]     Allergies  Penicillins and Varenicline    Medications  Current Medications[2]    Review of Systems    REVIEW OF SYSTEMS  GENERAL:  Negative for malaise, significant weight loss, fever      Objective:   Vasc: DP and PT pulses are palpable bilateral.  CFT is less than 3 seconds bilateral.  Skin temperature is warm to cool proximal to distal bilateral.      Neuro:  Light touch is intact to the foot bilateral.  Protective sensation is intact to the foot when tested with the 5.07 SWM bilateral.      Derm: There is a hyperkeratotic lesion " noted subsecond metatarsal head on the right.  No evidence of verruca.  No evidence of open wound.  No other preulcerative calluses.    Ortho: Muscle strength is 5/5 for all pedal groups tested.  High arch foot type is noted.  There is contracture of the extensor tendons with hammertoe deformities noted to digits 1 through 5 bilaterally.  Prominent metatarsal heads are noted with pain along the metatarsal heads bilaterally.    Right foot x-ray from 2023 reviewed.  Impression: Hammertoe deformities are seen.  No acute osseous findings      Assessment/Plan     Diagnoses and all orders for this visit:  Pain in both feet  Metatarsalgia of both feet  Hammer toes of both feet  Type 2 diabetes mellitus without complication, without long-term current use of insulin      Type 2 diabetes  The patient was educated on proper diabetic footcare.  They understand the importance of controlling their hemoglobin A1c as this prevents the progression of neuropathy.  Review of the chart and the primary care note does show that they are working on this with the primary care.  The patient understands to inspect their feet daily.  If they notice any changes in their exam they are to call the office immediately     2.  Bilateral foot pain with hammertoes and metatarsalgia  The patient's pain to the ball of the foot is secondary to retrograde forces as he does have a high arch foot type with hammertoe deformities.  This is causing prominent metatarsal heads.  We did discuss the role of topical compounding cream which was prescribed today.  Have also recommended diabetic shoes and inserts.  He was given a prescription today.    The patient was evaluated and examined.  We discussed his diagnosis as well as treatment options.  He is agreeable to the plan.  He understands to call me immediately should problems arise prior to follow-up       Yulissa Valverde DPM       [1]   Family History  Problem Relation Name Age of Onset    Diabetes Mother       Colon cancer Father      Diabetes Mother's Brother     [2]   Current Outpatient Medications   Medication Sig Dispense Refill    ascorbic acid (Vitamin C) 1,000 mg tablet Take 1 tablet (1,000 mg) by mouth once daily.      cholecalciferol (Vitamin D-3) 25 MCG (1000 UT) capsule Take by mouth.      clindamycin (Cleocin) 300 mg capsule Take 1 capsule (300 mg) by mouth once daily. until finished      clotrimazole-betamethasone (Lotrisone) cream Apply 1 Application topically 2 times a day.      erythromycin base (E-Mycin) 500 mg tablet TAKE 2 TABLETS 1 hour before dental appointment      esomeprazole (NexIUM) 40 mg DR capsule Take 1 capsule (40 mg) by mouth once daily. 90 capsule 0    gabapentin (Neurontin) 300 mg capsule Take 1 capsule (300 mg) by mouth 2 times a day. Take 1 to 2 capsules nightly.  Titrate up to 2 capsules nightly after 1 week. 60 capsule 11    ibuprofen 800 mg tablet Take 1 tablet (800 mg) by mouth every 6 hours if needed.      lidocaine (Lidocaine Viscous) 2 % solution Soak approx 1.25 mL in gauze/cotton and apply to affected area every 4-6 hours prn pain.  Spit the medication, do not swallow. 100 mL 0    magnesium oxide (Mag-Ox) 400 mg (241.3 mg magnesium) tablet Take 1 tablet by mouth once daily.      meloxicam (Mobic) 15 mg tablet Take 1 tablet (15 mg) by mouth once daily. 30 tablet 11    mometasone (Elocon) 0.1 % ointment Apply topically once daily. 15 g 1    naloxone (Narcan) 4 mg/0.1 mL nasal spray Administer 1 spray (4 mg) into affected nostril(s) if needed for opioid reversal. May repeat every 2-3 minutes if needed, alternating nostrils, until medical assistance becomes available. 2 each 0    traMADol (Ultram) 50 mg tablet Take 1-2 tablets ( mg) by mouth every 12 hours if needed for severe pain (7 - 10). Do not fill before June 18, 2025. 60 tablet 0    rOPINIRole (Requip) 0.5 mg tablet Take 1 tablet (0.5 mg) by mouth once daily at bedtime. 90 tablet 0     No current  facility-administered medications for this visit.

## 2025-07-14 ENCOUNTER — APPOINTMENT (OUTPATIENT)
Dept: PRIMARY CARE | Facility: CLINIC | Age: 65
End: 2025-07-14
Payer: COMMERCIAL

## 2025-07-14 VITALS
TEMPERATURE: 98.1 F | HEIGHT: 73 IN | OXYGEN SATURATION: 94 % | SYSTOLIC BLOOD PRESSURE: 131 MMHG | DIASTOLIC BLOOD PRESSURE: 72 MMHG | WEIGHT: 275 LBS | HEART RATE: 79 BPM | BODY MASS INDEX: 36.45 KG/M2

## 2025-07-14 DIAGNOSIS — G89.29 CHRONIC BILATERAL LOW BACK PAIN WITHOUT SCIATICA: ICD-10-CM

## 2025-07-14 DIAGNOSIS — E55.9 VITAMIN D DEFICIENCY: ICD-10-CM

## 2025-07-14 DIAGNOSIS — E66.01 CLASS 2 SEVERE OBESITY WITH SERIOUS COMORBIDITY AND BODY MASS INDEX (BMI) OF 36.0 TO 36.9 IN ADULT, UNSPECIFIED OBESITY TYPE: ICD-10-CM

## 2025-07-14 DIAGNOSIS — M20.42 HAMMER TOES OF BOTH FEET: ICD-10-CM

## 2025-07-14 DIAGNOSIS — E78.2 MIXED HYPERLIPIDEMIA: ICD-10-CM

## 2025-07-14 DIAGNOSIS — N40.1 BENIGN PROSTATIC HYPERPLASIA WITH URINARY RETENTION: ICD-10-CM

## 2025-07-14 DIAGNOSIS — M20.41 HAMMER TOES OF BOTH FEET: ICD-10-CM

## 2025-07-14 DIAGNOSIS — G47.33 OSA (OBSTRUCTIVE SLEEP APNEA): ICD-10-CM

## 2025-07-14 DIAGNOSIS — K21.9 GASTROESOPHAGEAL REFLUX DISEASE WITHOUT ESOPHAGITIS: ICD-10-CM

## 2025-07-14 DIAGNOSIS — E78.2 MIXED HYPERLIPIDEMIA: Primary | ICD-10-CM

## 2025-07-14 DIAGNOSIS — E11.9 TYPE 2 DIABETES MELLITUS WITHOUT COMPLICATION, WITHOUT LONG-TERM CURRENT USE OF INSULIN: ICD-10-CM

## 2025-07-14 DIAGNOSIS — F17.200 CURRENT SMOKER: ICD-10-CM

## 2025-07-14 DIAGNOSIS — E66.812 CLASS 2 SEVERE OBESITY WITH SERIOUS COMORBIDITY AND BODY MASS INDEX (BMI) OF 36.0 TO 36.9 IN ADULT, UNSPECIFIED OBESITY TYPE: ICD-10-CM

## 2025-07-14 DIAGNOSIS — M54.50 CHRONIC BILATERAL LOW BACK PAIN WITHOUT SCIATICA: ICD-10-CM

## 2025-07-14 DIAGNOSIS — R33.8 BENIGN PROSTATIC HYPERPLASIA WITH URINARY RETENTION: ICD-10-CM

## 2025-07-14 DIAGNOSIS — G47.61 PERIODIC LIMB MOVEMENT: ICD-10-CM

## 2025-07-14 LAB
25(OH)D3+25(OH)D2 SERPL-MCNC: 52 NG/ML (ref 30–100)
ALBUMIN SERPL-MCNC: 4.5 G/DL (ref 3.6–5.1)
ALBUMIN/CREAT UR: NORMAL MG/G CREAT
ALP SERPL-CCNC: 88 U/L (ref 35–144)
ALT SERPL-CCNC: 25 U/L (ref 9–46)
ANION GAP SERPL CALCULATED.4IONS-SCNC: 9 MMOL/L (CALC) (ref 7–17)
AST SERPL-CCNC: 21 U/L (ref 10–35)
BILIRUB SERPL-MCNC: 0.8 MG/DL (ref 0.2–1.2)
BUN SERPL-MCNC: 18 MG/DL (ref 7–25)
CALCIUM SERPL-MCNC: 9.4 MG/DL (ref 8.6–10.3)
CHLORIDE SERPL-SCNC: 105 MMOL/L (ref 98–110)
CHOLEST SERPL-MCNC: 190 MG/DL
CHOLEST/HDLC SERPL: 3.8 (CALC)
CO2 SERPL-SCNC: 26 MMOL/L (ref 20–32)
CREAT SERPL-MCNC: 0.84 MG/DL (ref 0.7–1.35)
CREAT UR-MCNC: 73 MG/DL (ref 20–320)
EGFRCR SERPLBLD CKD-EPI 2021: 99 ML/MIN/1.73M2
EST. AVERAGE GLUCOSE BLD GHB EST-MCNC: 143 MG/DL
EST. AVERAGE GLUCOSE BLD GHB EST-SCNC: 7.9 MMOL/L
GLUCOSE SERPL-MCNC: 98 MG/DL (ref 65–99)
HBA1C MFR BLD: 6.6 %
HDLC SERPL-MCNC: 50 MG/DL
LDLC SERPL CALC-MCNC: 124 MG/DL (CALC)
MICROALBUMIN UR-MCNC: <0.2 MG/DL
NONHDLC SERPL-MCNC: 140 MG/DL (CALC)
POTASSIUM SERPL-SCNC: 4.2 MMOL/L (ref 3.5–5.3)
PROT SERPL-MCNC: 7.2 G/DL (ref 6.1–8.1)
SODIUM SERPL-SCNC: 140 MMOL/L (ref 135–146)
TRIGL SERPL-MCNC: 69 MG/DL

## 2025-07-14 PROCEDURE — 3008F BODY MASS INDEX DOCD: CPT | Performed by: FAMILY MEDICINE

## 2025-07-14 PROCEDURE — 99214 OFFICE O/P EST MOD 30 MIN: CPT | Performed by: FAMILY MEDICINE

## 2025-07-14 PROCEDURE — 3078F DIAST BP <80 MM HG: CPT | Performed by: FAMILY MEDICINE

## 2025-07-14 PROCEDURE — 3075F SYST BP GE 130 - 139MM HG: CPT | Performed by: FAMILY MEDICINE

## 2025-07-14 RX ORDER — TRAMADOL HYDROCHLORIDE 50 MG/1
50-100 TABLET, FILM COATED ORAL EVERY 12 HOURS PRN
Qty: 60 TABLET | Refills: 0 | Status: SHIPPED | OUTPATIENT
Start: 2025-07-14

## 2025-07-14 RX ORDER — ROPINIROLE 0.5 MG/1
0.5 TABLET, FILM COATED ORAL NIGHTLY
Qty: 90 TABLET | Refills: 0 | Status: SHIPPED | OUTPATIENT
Start: 2025-07-14 | End: 2025-10-12

## 2025-07-14 RX ORDER — ESOMEPRAZOLE MAGNESIUM 40 MG/1
40 CAPSULE, DELAYED RELEASE ORAL DAILY
Qty: 90 CAPSULE | Refills: 0 | Status: SHIPPED | OUTPATIENT
Start: 2025-07-14 | End: 2025-10-12

## 2025-07-14 RX ORDER — GABAPENTIN 300 MG/1
300 CAPSULE ORAL 2 TIMES DAILY
Qty: 60 CAPSULE | Refills: 11 | Status: CANCELLED | OUTPATIENT
Start: 2025-07-14

## 2025-07-14 RX ORDER — MELOXICAM 15 MG/1
15 TABLET ORAL DAILY
Qty: 30 TABLET | Refills: 11 | Status: CANCELLED | OUTPATIENT
Start: 2025-07-14 | End: 2026-07-14

## 2025-07-14 ASSESSMENT — LIFESTYLE VARIABLES: TOTAL SCORE: 0

## 2025-07-14 NOTE — PATIENT INSTRUCTIONS
Follow up in 3 months with labs to be done PRIOR.    It was a pleasure to see you today. Thank you for choosing us for your health care needs.    If you have lab or other testing completed and have not been informed of results within one week, please call the office for your results.    If you haven't done so, consider signing up for [facility] [employer], the [facility] personal health record. Ask the staff how you can get started.

## 2025-07-14 NOTE — PROGRESS NOTES
Subjective   Patient ID: Ivan Johnson is a 64 y.o. male who presents for Follow-up.    HPI      Review labs: 07/12/2025  PSA: 10/2024  Colon: 11/22/2013- Due       Patient has hyperlipidemia.  Patient tries to limit the amount of fatty foods and high cholesterol foods that he consumes  His hyperlipidemia is currently treated with dietary/ lifestyle changes.      He has GERD.  He was treated with Esomeprazole.   He denies any breakthrough reflux symptoms.      Patient has obstructive sleep apnea.  Patient utilizes a CPAP machine on a nightly basis.  He is tolerating the machine well and does feel well rested in the mornings.     He has periodic leg movement disorder.  Symptoms were stable when he was on Requip.     He has chronic lower back pain and hip pain.  This has been effectively treated with Tramadol as needed.  NSAIDs have not provided adequate relief in the past.     He has a history of BPH/urinary frequency/urinary retention.  Symptoms have been well-controlled on the current combination of oxybutynin and Flomax.      He has nonalcoholic fatty liver disease.  Pt underwent laboratory evaluation as well as an ultrasound of the liver.   Patient has not had an actual biopsy performed.      He has vitamin D deficiency.  Patient continues to take over-the-counter vitamin D supplementation of 1000 units daily.     Patient has history of plantar warts.  Presented to urgent care with pain in right foot s/p plantar wart removal on 5/8/2025.  Follows with Podiatry and recommended orthopedic shoes.     OARRS:  Sushant Wiseman DO on 7/14/2025  5:28 PM  I have personally reviewed the OARRS report for Ivan Johnson. I have considered the risks of abuse, dependence, addiction and diversion and I believe that it is clinically appropriate for Ivan Johnson to be prescribed this medication    Is the patient prescribed a combination of a benzodiazepine and opioid?  No    Last Urine Drug Screen / ordered today: No  Recent  Results (from the past 8760 hours)   Drug Screen, Urine With Reflex to Confirmation    Collection Time: 04/14/25  6:11 PM   Result Value Ref Range    Fentanyl NEGATIVE <0.5 ng/mL    Amphetamines NEGATIVE <500 ng/mL    Barbiturates NEGATIVE <300 ng/mL    Benzodiazepines NEGATIVE <100 ng/mL    Cocaine Metabolite NEGATIVE <150 ng/mL    Marijuana Metabolite NEGATIVE <20 ng/mL    Methadone Metabolite NEGATIVE <100 ng/mL    Opiates NEGATIVE <100 ng/mL    Oxycodone NEGATIVE <100 ng/mL    Phencyclidine NEGATIVE <25 ng/mL    Creatinine 148.6 > or = 20.0 mg/dL    pH 7.1 4.5 - 9.0    Oxidant NEGATIVE <200 mcg/mL    Notes and Comments       Results are as expected.       Controlled Substance Agreement:  Date of the Last Agreement: 4/14/2025  Reviewed Controlled Substance Agreement including but not limited to the benefits, risks, and alternatives to treatment with a Controlled Substance medication(s).    Opioids:  What is the patient's goal of therapy?  Reduce pain on an as-needed basis  Is this being achieved with current treatment? yes    I have calculated the patient's Morphine Dose Equivalent (MED):   I have considered referral to Pain Management and/or a specialist, and do not feel it is necessary at this time.    I feel that it is clinically indicated to continue this current medication regimen after consideration of alternative therapies, and other non-opioid treatment.    Opioid Risk Screening:  Family History of Substance Abuse  Alcohol: 0 (7/14/2025  5:00 PM)  Illegal Drugs: 0 (7/14/2025  5:00 PM)  Prescription Drugs: 0 (7/14/2025  5:00 PM)    Personal History of Substance Abuse  Alcohol: 0 (7/14/2025  5:00 PM)  Illegal Drugs: 0 (7/14/2025  5:00 PM)  Prescription Drugs: 0 (7/14/2025  5:00 PM)    Patient Age (16-45)  Age (16-45): 0 (7/14/2025  5:00 PM)    History of Preadolescent Sexual Abuse  History of Preadolescent Sexual Abuse: .0 (7/14/2025  5:00 PM)    Psychological Disease  Attention Deficit Disorder, Obsessive  Compulsive Disorder, Bipolar, Schizophrenia: 0 (7/14/2025  5:00 PM)  Depression: 0 (7/14/2025  5:00 PM)    Total Score  Total Score: 0 (7/14/2025  5:00 PM)    Total Score Risk Category  TOTAL SCORE CATEGORY: Low Risk (0-3) (7/14/2025  5:00 PM)        Pain Assessment:  Analgesia  What was your pain level on average during the past week?: 3  What was your pain level at its worst during the past week?: 7  What percentage of your pain has been relieved during the past week?: 35 %  Is the amount of pain relief you are now obtaining from your current pain relievers enough to make a real difference in your life?: Y  Query to Clinician: Is the patient's pain relief clinically significant?: Yes    Activities of Daily Living  Physical Functioning: Same  Family Relationships: Same  Social Relationships: Same  Mood: Same  Sleep Patterns: Same  Overall Functioning: Same    Adverse Events  Is patient experiencing any side effects from current pain relievers?: N  Patient's Overall Severity of Side Effects: None      Assessment  Is your overall impression that this patient is benefiting from opioid therapy?: Yes  Specific Analgesic Plan: Continue present regimen        Review of Systems  Constitutional: Patient denies any fever, chills, loss of appetite, or unexplained weight loss.  Cardiovascular: Patient denies any chest pain, shortness of breath with exertion, tachycardia, palpitations, orthopnea, or paroxysmal nocturnal dyspnea.  Respiratory: Patient denies any cough, shortness of breath, or wheezing.  Gastrointestinal: Patient denies any nausea, vomiting, diarrhea, constipation, melena, hematochezia.  No breakthrough reflux systems.  Skin: Denies any rashes or skin lesions  Neurology: Patient denies any new motor or sensory losses. Denies any numbness, tingling, weakness, and incoordination of the extremities. Patient also denies any tremor, seizures, or gait instability.  Endocrinology: Denies any polyuria, polydipsia,  "polyphagia, or heat/cold intolerance.  Psychiatric: Patient denies any depression, anxiety, or suicidal/homicidal ideation.      Objective   /72 (BP Location: Right arm, Patient Position: Sitting, BP Cuff Size: Adult)   Pulse 79   Temp 36.7 °C (98.1 °F) (Temporal)   Ht 1.854 m (6' 1\")   Wt 125 kg (275 lb)   SpO2 94%   BMI 36.28 kg/m²     Physical Exam  General Appearance: Alert and cooperative, in no acute distress, well-developed/well-nourished obese male.    Neck: Supple and without adenopathy or rigidity. There is no JVD at 90° and no carotid bruits are noted. There is no thyromegaly, thyroid tenderness, or palpable thyroid nodules.  Heart: Regular rate and rhythm without murmur or ectopy.  Lungs: Clear to auscultation bilaterally with good air exchange.  Skin: Good turgor, moist, warm and without rashes or lesions.  Neurological exam: Alert and oriented ×3, no tremor, normal gait.  Extremities: No clubbing, cyanosis, or edema  Psychiatric: Appropriate mood and affect, good insight and judgment, no delusions or thought disorders, no suicidal or homicidal ideation    Feet:  Hammertoe deformities noted.    Assessment/Plan       Hyperlipidemia:   Patient will continue with conservative treatment.  Dietary changes, exercise, and maintenance of healthy weight were discussed at length.  Lab Results   Component Value Date    CHOL 190 07/12/2025    CHOL 178 01/25/2025    CHOL 190 09/15/2023     Lab Results   Component Value Date    HDL 50 07/12/2025    HDL 45.7 01/25/2025    HDL 42.7 09/15/2023     Lab Results   Component Value Date    LDLCALC 124 (H) 07/12/2025    LDLCALC 122 (H) 01/25/2025     Lab Results   Component Value Date    TRIG 69 07/12/2025    TRIG 52 01/25/2025    TRIG 68 09/15/2023   LDL cholesterol remains elevated but stable based on labs.      Diabetes mellitus type 2:   Stable based on most recent labs.  Dietary changes, exercise, and maintenance of the healthy weight were " encouraged.  Patient was advised to have a diabetic eye exam annually.  Patient was also advised to check the feet on a regular basis.  Lab Results   Component Value Date    HGBA1C 6.6 (H) 07/12/2025   A1c has mild increase from 6.5% up to 6.6%.    Chronic bilateral lower back pain without sciatica:  Stable based on symptoms.  NSAIDs have been ineffective for him in the past.  Pain continues to be slowly progressive over years of time.  7/14/2025:  We will discontinue gabapentin as pt has not noted any improvement with the medication and would like to stop it.    Will continue to manage with tramadol as needed for pain.     Vitamin D deficiency:  Vitamin D was low on his last labs.    He was encouraged to be compliant with daily vitamin D supplementation.  Lab Results   Component Value Date    VITD25 52 07/12/2025    VITD25 23 (L) 10/26/2024    VITD25 26 (L) 03/08/2024   Will continue to monitor with lab.     Periodic limb movement disorder:  Stable based on symptoms.  Continue ropinirole at the current dose.     Benign prostatic hypertrophy with urinary retention:  Stable based on symptoms and he denies any increased urinary urgency or frequency.  Will continue the current treatment plan.     Obstructive sleep apnea:  Stable based on symptoms.  Patient will continue CPAP therapy.  Patient utilizes a CPAP for more than 4 hours per night and continues to benefit from use.  He does feel refreshed in the mornings.     Current smoker:  Patient understands that continued smoking will increase the risks of lung disease, vascular disease, and multiple malignancies.  Pt. has been encouraged to work on smoking cessation and available smoking cessation aids were discussed.     GERD:  Stable based on symptoms.  Continue the current medication and appropriate dietary changes.     Obesity:   Dietary changes, exercise, and maintenance of a healthy weight were discussed at length.  Goal is to achieve a BMI less than  25.    Davidheather:  Given his foot deformities and diabetes, he would benefit from diabetic footwear that will be ordered for him in the future.    PSA DUE: 10/2025  Colon OVERDUE: 11/2023    Follow up in 3 months with labs and as needed.    Scribe Attestation  By signing my name below, I, Kayla Allen Bee   attest that this documentation has been prepared under the direction and in the presence of Sushant Wiseman DO.    Orders Placed This Encounter   Procedures    Basic Metabolic Panel    Hemoglobin A1C       Requested Prescriptions     Pending Prescriptions Disp Refills    meloxicam (Mobic) 15 mg tablet 30 tablet 11     Sig: Take 1 tablet (15 mg) by mouth once daily.    traMADol (Ultram) 50 mg tablet 60 tablet 0     Sig: Take 1-2 tablets ( mg) by mouth every 12 hours if needed for severe pain (7 - 10).    esomeprazole (NexIUM) 40 mg DR capsule 90 capsule 0     Sig: Take 1 capsule (40 mg) by mouth once daily.    rOPINIRole (Requip) 0.5 mg tablet 90 tablet 0     Sig: Take 1 tablet (0.5 mg) by mouth once daily at bedtime.

## 2025-08-14 DIAGNOSIS — M54.50 CHRONIC BILATERAL LOW BACK PAIN WITHOUT SCIATICA: ICD-10-CM

## 2025-08-14 DIAGNOSIS — G89.29 CHRONIC BILATERAL LOW BACK PAIN WITHOUT SCIATICA: ICD-10-CM

## 2025-08-14 RX ORDER — TRAMADOL HYDROCHLORIDE 50 MG/1
50-100 TABLET, FILM COATED ORAL EVERY 12 HOURS PRN
Qty: 60 TABLET | Refills: 0 | Status: SHIPPED | OUTPATIENT
Start: 2025-08-14

## 2025-08-15 ENCOUNTER — OFFICE VISIT (OUTPATIENT)
Dept: URGENT CARE | Age: 65
End: 2025-08-15
Payer: COMMERCIAL

## 2025-08-15 VITALS
RESPIRATION RATE: 18 BRPM | DIASTOLIC BLOOD PRESSURE: 79 MMHG | BODY MASS INDEX: 36.45 KG/M2 | SYSTOLIC BLOOD PRESSURE: 143 MMHG | HEIGHT: 73 IN | HEART RATE: 62 BPM | OXYGEN SATURATION: 96 % | WEIGHT: 275 LBS | TEMPERATURE: 98.3 F

## 2025-08-15 DIAGNOSIS — K08.89 ODONTALGIA: ICD-10-CM

## 2025-08-15 DIAGNOSIS — K02.9 DENTAL CARIES INTO PULP: ICD-10-CM

## 2025-08-15 DIAGNOSIS — K04.7 PERIAPICAL ABSCESS: Primary | ICD-10-CM

## 2025-08-15 RX ORDER — LIDOCAINE HYDROCHLORIDE 20 MG/ML
SOLUTION OROPHARYNGEAL
Qty: 100 ML | Refills: 0 | Status: SHIPPED | OUTPATIENT
Start: 2025-08-15

## 2025-08-15 RX ORDER — CLINDAMYCIN HYDROCHLORIDE 300 MG/1
300 CAPSULE ORAL 3 TIMES DAILY
Qty: 21 CAPSULE | Refills: 0 | Status: SHIPPED | OUTPATIENT
Start: 2025-08-15 | End: 2025-08-22

## 2025-08-20 DIAGNOSIS — G47.61 PERIODIC LIMB MOVEMENT: ICD-10-CM

## 2025-08-20 DIAGNOSIS — R35.0 INCREASED FREQUENCY OF URINATION: Primary | ICD-10-CM

## 2025-08-20 DIAGNOSIS — K21.9 GASTROESOPHAGEAL REFLUX DISEASE WITHOUT ESOPHAGITIS: ICD-10-CM

## 2025-08-20 RX ORDER — ESOMEPRAZOLE MAGNESIUM 40 MG/1
40 CAPSULE, DELAYED RELEASE ORAL DAILY
Qty: 90 CAPSULE | Refills: 0 | Status: SHIPPED | OUTPATIENT
Start: 2025-08-20 | End: 2025-11-18

## 2025-08-20 RX ORDER — OXYBUTYNIN CHLORIDE 5 MG/1
5 TABLET ORAL 2 TIMES DAILY
Qty: 180 TABLET | Refills: 0 | Status: SHIPPED | OUTPATIENT
Start: 2025-08-20

## 2025-08-20 RX ORDER — ROPINIROLE 0.5 MG/1
0.5 TABLET, FILM COATED ORAL NIGHTLY
Qty: 90 TABLET | Refills: 0 | Status: SHIPPED | OUTPATIENT
Start: 2025-08-20 | End: 2025-11-18

## 2025-10-20 ENCOUNTER — APPOINTMENT (OUTPATIENT)
Dept: PRIMARY CARE | Facility: CLINIC | Age: 65
End: 2025-10-20
Payer: COMMERCIAL